# Patient Record
Sex: MALE | Race: WHITE | URBAN - NONMETROPOLITAN AREA
[De-identification: names, ages, dates, MRNs, and addresses within clinical notes are randomized per-mention and may not be internally consistent; named-entity substitution may affect disease eponyms.]

---

## 2020-11-03 PROBLEM — I10 HTN (HYPERTENSION): Status: RESOLVED | Noted: 2020-11-03 | Resolved: 2020-11-03

## 2021-09-10 LAB
ABSOLUTE BASO #: 0 /CMM (ref 0–200)
ABSOLUTE EOS #: 300 /CMM (ref 0–500)
ABSOLUTE LYMPH #: 800 /CMM (ref 1000–4800)
ABSOLUTE MONO #: 500 /CMM (ref 0–800)
ABSOLUTE NEUT #: 6700 /CMM (ref 1800–7700)
ANION GAP SERPL CALCULATED.3IONS-SCNC: 7 MMOL/L (ref 4–12)
BASOPHILS RELATIVE PERCENT: 0.5 % (ref 0–2)
BUN BLDV-MCNC: 23 MG/DL (ref 7–20)
CALCIUM SERPL-MCNC: 8.5 MG/DL (ref 8.8–10.5)
CHLORIDE BLD-SCNC: 108 MEQ/L (ref 101–111)
CO2: 20 MEQ/L (ref 21–32)
CREAT SERPL-MCNC: 1.47 MG/DL (ref 0.6–1.3)
CREATININE CLEARANCE: 47
EOSINOPHILS RELATIVE PERCENT: 3.8 % (ref 0–6)
GLUCOSE: 105 MG/DL (ref 70–110)
HCT VFR BLD CALC: 38.8 % (ref 40–49)
HEMOGLOBIN: 13.3 GM/DL (ref 13.5–16.5)
LYMPHOCYTES RELATIVE PERCENT: 10.1 % (ref 15–45)
MCH RBC QN AUTO: 33.3 PG (ref 27.5–33)
MCHC RBC AUTO-ENTMCNC: 34.3 GM/DL (ref 33–36)
MCV RBC AUTO: 97.1 CU MIC (ref 80–97)
MONOCYTES RELATIVE PERCENT: 5.4 % (ref 2–10)
MRSA SCREEN: NEGATIVE
NEUTROPHILS RELATIVE PERCENT: 80.2 % (ref 40–70)
NUCLEATED RBCS: 0 /100 WBC
PDW BLD-RTO: 13.3 % (ref 12–16)
PLATELET # BLD: 194 TH/CMM (ref 150–400)
POTASSIUM SERPL-SCNC: 4.7 MEQ/L (ref 3.6–5)
RBC # BLD: 4 MIL/CMM (ref 4.5–6)
SODIUM BLD-SCNC: 135 MEQ/L (ref 135–145)
WBC # BLD: 8.4 TH/CMM (ref 4.4–10.5)

## 2022-06-02 ENCOUNTER — OFFICE VISIT (OUTPATIENT)
Dept: PSYCHOLOGY | Age: 72
End: 2022-06-02
Payer: COMMERCIAL

## 2022-06-02 DIAGNOSIS — F43.23 ADJUSTMENT DISORDER WITH MIXED ANXIETY AND DEPRESSED MOOD: Primary | ICD-10-CM

## 2022-06-02 PROCEDURE — 90791 PSYCH DIAGNOSTIC EVALUATION: CPT | Performed by: PSYCHOLOGIST

## 2022-06-02 PROCEDURE — 1123F ACP DISCUSS/DSCN MKR DOCD: CPT | Performed by: PSYCHOLOGIST
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
## 2022-06-10 ENCOUNTER — OFFICE VISIT (OUTPATIENT)
Dept: PSYCHOLOGY | Age: 72
End: 2022-06-10
Payer: COMMERCIAL

## 2022-06-10 DIAGNOSIS — F43.23 ADJUSTMENT DISORDER WITH MIXED ANXIETY AND DEPRESSED MOOD: Primary | ICD-10-CM

## 2022-06-10 PROCEDURE — 1123F ACP DISCUSS/DSCN MKR DOCD: CPT | Performed by: PSYCHOLOGIST

## 2022-06-10 PROCEDURE — 90837 PSYTX W PT 60 MINUTES: CPT | Performed by: PSYCHOLOGIST

## 2022-06-10 NOTE — PROGRESS NOTES
Behavioral Health Consultation  María Simmons, PhD  Psychologist  6/14/2022       Time spent with Patient:  60 minutes  This is patient's second  [facility] appointment. Reason for Consult:  depression, anxiety, stress and cognitive impairment  Referring Provider: No referring provider defined for this encounter. Pt provided informed consent for the behavioral health program. Discussed with patient model of service to include the limits of confidentiality (i.e. abuse reporting, suicide intervention, etc.) and short-term intervention focused approach. Pt indicated understanding. Feedback given to PCP. Description:    MSE:    Appearance    cooperative  Appetite normal  Sleep disturbance Yes  Loss of pleasure No  Speech    normal rate, normal volume and well articulated  Mood    Anxious  Affect    normal affect  Thought Content    helplessness  Insight    Fair  Judgment    Intact  Suicide Assessment    no suicidal ideation  Homicidal Assessment Intent No  Cutting No  Enuresis No  Learning Disorder none      History:    Medications:   Current Outpatient Medications   Medication Sig Dispense Refill    hydrALAZINE (APRESOLINE) 25 MG tablet TAKE ONE TABLET BY MOUTH EVERY 8 HOURS 270 tablet 3    tadalafil (CIALIS) 10 MG tablet Take 1 tablet by mouth as needed for Erectile Dysfunction. 30 tablet 6    omeprazole (PRILOSEC) 20 MG capsule Take 1 capsule by mouth 2 times daily. 30 capsule 6    omeprazole (PRILOSEC) 20 MG capsule TAKE ONE CAPSULE EVERY DAY 30 capsule 11    tadalafil (CIALIS) 5 MG tablet Take 1 tablet by mouth as needed. 30 tablet 6    Testosterone (STRIANT) 30 MG MISC Place 30 mg inside cheek 2 times daily. 60 each 6     No current facility-administered medications for this visit.        Social History:   Social History     Socioeconomic History    Marital status:      Spouse name: Not on file    Number of children: Not on file    Years of education: Not on file    Highest education level: Not on file   Occupational History    Not on file   Tobacco Use    Smoking status: Former Smoker     Packs/day: 1.50     Years: 24.00     Pack years: 36.00     Types: Cigarettes     Quit date: 2002     Years since quittin.4    Smokeless tobacco: Never Used   Substance and Sexual Activity    Alcohol use: No    Drug use: No    Sexual activity: Not on file   Other Topics Concern    Not on file   Social History Narrative    Not on file     Social Determinants of Health     Financial Resource Strain:     Difficulty of Paying Living Expenses: Not on file   Food Insecurity:     Worried About Running Out of Food in the Last Year: Not on file    Nanci of Food in the Last Year: Not on file   Transportation Needs:     Lack of Transportation (Medical): Not on file    Lack of Transportation (Non-Medical): Not on file   Physical Activity:     Days of Exercise per Week: Not on file    Minutes of Exercise per Session: Not on file   Stress:     Feeling of Stress : Not on file   Social Connections:     Frequency of Communication with Friends and Family: Not on file    Frequency of Social Gatherings with Friends and Family: Not on file    Attends Holiness Services: Not on file    Active Member of [address] [place] [address] [employer] or Organizations: Not on file    Attends Club or Organization Meetings: Not on file    Marital Status: Not on file   Intimate Partner Violence:     Fear of Current or Ex-Partner: Not on file    Emotionally Abused: Not on file    Physically Abused: Not on file    Sexually Abused: Not on file   Housing Stability:     Unable to Pay for Housing in the Last Year: Not on file    Number of [place] in the Last Year: Not on file    Unstable Housing in the Last Year: Not on file       TOBACCO:   reports that he quit smoking about 20 years ago. His smoking use included cigarettes. He has a 36.00 pack-year smoking history.  He has never used smokeless tobacco.  ETOH:   reports no history of alcohol use.    Family History:   No family history on file. 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; he has memories of the past that surface at times; this therapist started to give him the [facility] neurological assessment and the time ran out before getting to the practices; this therapist scored the front part of the test and it appears that his some orientation, presidential memory, naming, comprehension, and constructive praxis is not showing any major impairment; however, this therapist needs to finish administering the attention section during the next assessment; he has been very anxious and worried about his cognitive abilities; his is working memory was in the severe range so this therapist will continue to focus on the cognitive issues and also help him develop skills to deal with the stress; he is thinking about retiring. Diagnosis:      ICD-10-CM    1. Adjustment disorder with mixed anxiety and depressed mood  F43.23             Plan:  Pt interventions: The next session the patient will finish the assessment and this therapist will score and then review the results with him; session will continue to help him focus on stress management.

## 2022-06-21 ENCOUNTER — OFFICE VISIT (OUTPATIENT)
Dept: PSYCHOLOGY | Age: 72
End: 2022-06-21
Payer: COMMERCIAL

## 2022-06-21 DIAGNOSIS — F43.23 ADJUSTMENT DISORDER WITH MIXED ANXIETY AND DEPRESSED MOOD: Primary | ICD-10-CM

## 2022-06-21 PROCEDURE — 1123F ACP DISCUSS/DSCN MKR DOCD: CPT | Performed by: PSYCHOLOGIST

## 2022-06-21 PROCEDURE — 90837 PSYTX W PT 60 MINUTES: CPT | Performed by: PSYCHOLOGIST

## 2022-07-12 ENCOUNTER — OFFICE VISIT (OUTPATIENT)
Dept: PSYCHOLOGY | Age: 72
End: 2022-07-12
Payer: COMMERCIAL

## 2022-07-12 DIAGNOSIS — F43.23 ADJUSTMENT DISORDER WITH MIXED ANXIETY AND DEPRESSED MOOD: Primary | ICD-10-CM

## 2022-07-12 PROCEDURE — 1123F ACP DISCUSS/DSCN MKR DOCD: CPT | Performed by: PSYCHOLOGIST

## 2022-07-12 PROCEDURE — 90837 PSYTX W PT 60 MINUTES: CPT | Performed by: PSYCHOLOGIST

## 2022-07-12 NOTE — PROGRESS NOTES
History    Not on file   Tobacco Use    Smoking status: Former Smoker     Packs/day: 1.50     Years: 24.00     Pack years: 36.00     Types: Cigarettes     Quit date: 2002     Years since quittin.5    Smokeless tobacco: Never Used   Substance and Sexual Activity    Alcohol use: No    Drug use: No    Sexual activity: Not on file   Other Topics Concern    Not on file   Social History Narrative    Not on file     Social Determinants of Health     Financial Resource Strain:     Difficulty of Paying Living Expenses: Not on file   Food Insecurity:     Worried About Running Out of Food in the Last Year: Not on file    Nanci of Food in the Last Year: Not on file   Transportation Needs:     Lack of Transportation (Medical): Not on file    Lack of Transportation (Non-Medical): Not on file   Physical Activity:     Days of Exercise per Week: Not on file    Minutes of Exercise per Session: Not on file   Stress:     Feeling of Stress : Not on file   Social Connections:     Frequency of Communication with Friends and Family: Not on file    Frequency of Social Gatherings with Friends and Family: Not on file    Attends Hoahaoism Services: Not on file    Active Member of [address] [place] [address] [employer] or Organizations: Not on file    Attends Club or Organization Meetings: Not on file    Marital Status: Not on file   Intimate Partner Violence:     Fear of Current or Ex-Partner: Not on file    Emotionally Abused: Not on file    Physically Abused: Not on file    Sexually Abused: Not on file   Housing Stability:     Unable to Pay for Housing in the Last Year: Not on file    Number of [place] in the Last Year: Not on file    Unstable Housing in the Last Year: Not on file       TOBACCO:   reports that he quit smoking about 20 years ago. His smoking use included cigarettes. He has a 36.00 pack-year smoking history. He has never used smokeless tobacco.  ETOH:   reports no history of alcohol use.     Family History:   No family history on file. 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 Diagnosis:      ICD-10-CM    1. Adjustment disorder with mixed anxiety and depressed mood  F43.23             Plan:  Pt interventions:  He will continue to focus on today and not drink too much into the future; he will also continue to plan for prison.

## 2024-05-10 ENCOUNTER — HOSPITAL ENCOUNTER (EMERGENCY)
Age: 74
Discharge: PSYCHIATRIC OR SUBSTANCE USE FACILITY | End: 2024-05-11
Attending: STUDENT IN AN ORGANIZED HEALTH CARE EDUCATION/TRAINING PROGRAM
Payer: MEDICARE

## 2024-05-10 ENCOUNTER — APPOINTMENT (OUTPATIENT)
Dept: GENERAL RADIOLOGY | Age: 74
End: 2024-05-10
Payer: MEDICARE

## 2024-05-10 ENCOUNTER — APPOINTMENT (OUTPATIENT)
Dept: CT IMAGING | Age: 74
End: 2024-05-10
Payer: MEDICARE

## 2024-05-10 DIAGNOSIS — R41.82 ALTERED MENTAL STATUS, UNSPECIFIED ALTERED MENTAL STATUS TYPE: Primary | ICD-10-CM

## 2024-05-10 DIAGNOSIS — R45.851 SUICIDAL THOUGHTS: ICD-10-CM

## 2024-05-10 LAB
AMPHETAMINES UR QL SCN: NEGATIVE
ANION GAP SERPL CALC-SCNC: 14 MEQ/L (ref 8–16)
APAP SERPL-MCNC: < 5 UG/ML (ref 0–20)
BACTERIA: ABNORMAL
BARBITURATES UR QL SCN: NEGATIVE
BASOPHILS ABSOLUTE: 0 THOU/MM3 (ref 0–0.1)
BASOPHILS NFR BLD AUTO: 0.5 %
BENZODIAZ UR QL SCN: NEGATIVE
BILIRUB UR QL STRIP: NEGATIVE
BUN SERPL-MCNC: 9 MG/DL (ref 7–22)
BZE UR QL SCN: NEGATIVE
CALCIUM SERPL-MCNC: 9.3 MG/DL (ref 8.5–10.5)
CANNABINOIDS UR QL SCN: POSITIVE
CASTS #/AREA URNS LPF: ABNORMAL /LPF
CASTS #/AREA URNS LPF: ABNORMAL /LPF
CHARACTER UR: CLEAR
CHARCOAL URNS QL MICRO: ABNORMAL
CHLORIDE SERPL-SCNC: 100 MEQ/L (ref 98–111)
CO2 SERPL-SCNC: 23 MEQ/L (ref 23–33)
COLOR UR: YELLOW
CREAT SERPL-MCNC: 0.9 MG/DL (ref 0.4–1.2)
CRYSTALS URNS QL MICRO: ABNORMAL
DEPRECATED RDW RBC AUTO: 44.2 FL (ref 35–45)
EKG ATRIAL RATE: 83 BPM
EKG P AXIS: 48 DEGREES
EKG P-R INTERVAL: 166 MS
EKG Q-T INTERVAL: 352 MS
EKG QRS DURATION: 94 MS
EKG QTC CALCULATION (BAZETT): 413 MS
EKG R AXIS: -55 DEGREES
EKG T AXIS: 70 DEGREES
EKG VENTRICULAR RATE: 83 BPM
EOSINOPHIL NFR BLD AUTO: 3.8 %
EOSINOPHILS ABSOLUTE: 0.2 THOU/MM3 (ref 0–0.4)
EPITHELIAL CELLS, UA: ABNORMAL /HPF
ERYTHROCYTE [DISTWIDTH] IN BLOOD BY AUTOMATED COUNT: 13.1 % (ref 11.5–14.5)
ETHANOL SERPL-MCNC: < 0.01 %
FENTANYL: NEGATIVE
GFR SERPL CREATININE-BSD FRML MDRD: 88 ML/MIN/1.73M2
GLUCOSE SERPL-MCNC: 86 MG/DL (ref 70–108)
GLUCOSE UR QL STRIP.AUTO: NEGATIVE MG/DL
HCT VFR BLD AUTO: 44.8 % (ref 42–52)
HGB BLD-MCNC: 15.8 GM/DL (ref 14–18)
HGB UR QL STRIP.AUTO: NEGATIVE
IMM GRANULOCYTES # BLD AUTO: 0.01 THOU/MM3 (ref 0–0.07)
IMM GRANULOCYTES NFR BLD AUTO: 0.2 %
KETONES UR QL STRIP.AUTO: NEGATIVE
LEUKOCYTE ESTERASE UR QL STRIP.AUTO: NEGATIVE
LYMPHOCYTES ABSOLUTE: 1.4 THOU/MM3 (ref 1–4.8)
LYMPHOCYTES NFR BLD AUTO: 22 %
MCH RBC QN AUTO: 32.5 PG (ref 26–33)
MCHC RBC AUTO-ENTMCNC: 35.3 GM/DL (ref 32.2–35.5)
MCV RBC AUTO: 92.2 FL (ref 80–94)
MONOCYTES ABSOLUTE: 0.4 THOU/MM3 (ref 0.4–1.3)
MONOCYTES NFR BLD AUTO: 7 %
NEUTROPHILS ABSOLUTE: 4.2 THOU/MM3 (ref 1.8–7.7)
NEUTROPHILS NFR BLD AUTO: 66.5 %
NITRITE UR QL STRIP.AUTO: NEGATIVE
NRBC BLD AUTO-RTO: 0 /100 WBC
OPIATES UR QL SCN: NEGATIVE
OSMOLALITY SERPL CALC.SUM OF ELEC: 271.8 MOSMOL/KG (ref 275–300)
OXYCODONE: NEGATIVE
PCP UR QL SCN: NEGATIVE
PH UR STRIP.AUTO: 6.5 [PH] (ref 5–9)
PLATELET # BLD AUTO: 268 THOU/MM3 (ref 130–400)
PMV BLD AUTO: 10.1 FL (ref 9.4–12.4)
POTASSIUM SERPL-SCNC: 3.7 MEQ/L (ref 3.5–5.2)
PROT UR STRIP.AUTO-MCNC: 30 MG/DL
RBC # BLD AUTO: 4.86 MILL/MM3 (ref 4.7–6.1)
RBC #/AREA URNS HPF: ABNORMAL /HPF
RENAL EPI CELLS #/AREA URNS HPF: ABNORMAL /[HPF]
SALICYLATES SERPL-MCNC: < 0.3 MG/DL (ref 2–10)
SODIUM SERPL-SCNC: 137 MEQ/L (ref 135–145)
SPECIFIC GRAVITY UA: 1.01 (ref 1–1.03)
TROPONIN, HIGH SENSITIVITY: 13 NG/L (ref 0–12)
UROBILINOGEN, URINE: 1 EU/DL (ref 0–1)
WBC # BLD AUTO: 6.3 THOU/MM3 (ref 4.8–10.8)
WBC #/AREA URNS HPF: ABNORMAL /HPF
YEAST LIKE FUNGI URNS QL MICRO: ABNORMAL

## 2024-05-10 PROCEDURE — 81001 URINALYSIS AUTO W/SCOPE: CPT

## 2024-05-10 PROCEDURE — 99285 EMERGENCY DEPT VISIT HI MDM: CPT

## 2024-05-10 PROCEDURE — 80048 BASIC METABOLIC PNL TOTAL CA: CPT

## 2024-05-10 PROCEDURE — 85025 COMPLETE CBC W/AUTO DIFF WBC: CPT

## 2024-05-10 PROCEDURE — 93005 ELECTROCARDIOGRAM TRACING: CPT | Performed by: STUDENT IN AN ORGANIZED HEALTH CARE EDUCATION/TRAINING PROGRAM

## 2024-05-10 PROCEDURE — 70450 CT HEAD/BRAIN W/O DYE: CPT

## 2024-05-10 PROCEDURE — 84484 ASSAY OF TROPONIN QUANT: CPT

## 2024-05-10 PROCEDURE — 80143 DRUG ASSAY ACETAMINOPHEN: CPT

## 2024-05-10 PROCEDURE — 82077 ASSAY SPEC XCP UR&BREATH IA: CPT

## 2024-05-10 PROCEDURE — 36415 COLL VENOUS BLD VENIPUNCTURE: CPT

## 2024-05-10 PROCEDURE — 6370000000 HC RX 637 (ALT 250 FOR IP)

## 2024-05-10 PROCEDURE — 80307 DRUG TEST PRSMV CHEM ANLYZR: CPT

## 2024-05-10 PROCEDURE — 80179 DRUG ASSAY SALICYLATE: CPT

## 2024-05-10 PROCEDURE — 93010 ELECTROCARDIOGRAM REPORT: CPT | Performed by: INTERNAL MEDICINE

## 2024-05-10 PROCEDURE — 71045 X-RAY EXAM CHEST 1 VIEW: CPT

## 2024-05-10 RX ORDER — QUETIAPINE FUMARATE 25 MG/1
25 TABLET, FILM COATED ORAL ONCE
Status: COMPLETED | OUTPATIENT
Start: 2024-05-10 | End: 2024-05-10

## 2024-05-10 RX ADMIN — QUETIAPINE 25 MG: 25 TABLET, FILM COATED ORAL at 22:39

## 2024-05-10 ASSESSMENT — PATIENT HEALTH QUESTIONNAIRE - PHQ9
SUM OF ALL RESPONSES TO PHQ QUESTIONS 1-9: 0
1. LITTLE INTEREST OR PLEASURE IN DOING THINGS: NOT AT ALL
SUM OF ALL RESPONSES TO PHQ QUESTIONS 1-9: 0
SUM OF ALL RESPONSES TO PHQ QUESTIONS 1-9: 0
2. FEELING DOWN, DEPRESSED OR HOPELESS: NOT AT ALL
SUM OF ALL RESPONSES TO PHQ9 QUESTIONS 1 & 2: 0
SUM OF ALL RESPONSES TO PHQ QUESTIONS 1-9: 0

## 2024-05-10 ASSESSMENT — PAIN - FUNCTIONAL ASSESSMENT
PAIN_FUNCTIONAL_ASSESSMENT: NONE - DENIES PAIN
PAIN_FUNCTIONAL_ASSESSMENT: NONE - DENIES PAIN

## 2024-05-10 ASSESSMENT — SLEEP AND FATIGUE QUESTIONNAIRES
DO YOU HAVE DIFFICULTY SLEEPING: YES
SLEEP PATTERN: DIFFICULTY FALLING ASLEEP;DISTURBED/INTERRUPTED SLEEP
DO YOU USE A SLEEP AID: NO

## 2024-05-10 NOTE — ED NOTES
Pt handoff report received from AMBER Monte at this time. No acute distress noted. Pt reminded to attempt to give UA. Family at bedside. Call light in reach.

## 2024-05-10 NOTE — ED TRIAGE NOTES
Pt presents to the ED with family for altered mental status. Upon arrival to the ED pt is alert to person and place. She states he depends on her for the month and time of day. Pt is unable to state the correct year which his wife states is not normal for him. Wife states that last night he did not recognize her when she was sitting beside him. She states earlier today he got into the car and asked where she was when she was sitting beside her. Pt's wife states pt was diagnosed with dementia 2 years ago.  Pt denies pain.

## 2024-05-10 NOTE — ED PROVIDER NOTES
Final report electronically signed by Dr Frank Robertson on 5/10/2024 4:42 PM        See ED course below for my interpretation if applicable.  All radiology images independently reviewed by me in the clinical context of this patient, in addition to interpretation provided by the radiologist.      EKG interpretation (none if blank):  Normal sinus rhythm at a rate 83, no ST elevation  All EKG results are individually reviewed and interpreted by me in the clinical context of this patient.  All EKGs are also interpreted by our Cardiology department, final interpretation may not be available as of the writing of this note.      PREVIOUS RECORDS  AND EXTERNAL INFORMATION REVIEWED   History obtained from: chart review, the patient, and wife.    Pertinent previous and/or external records reviewed:  Previous hospital visits .    Case discussed with specialties other than Emergency Medicine: Behavioral access center/[facility], [place]      MEDICAL DECISION MAKING   Initial Assessment Summary:   74-year-old male past medical history hypertension, carpal tunnel, GERD, dementia presents to the ED for altered mental status.   Please see ED course and MDM sections below for continuation and resolution of this initial assessment if applicable.    Initial plan:   CBC, BMP, troponin, urinalysis  EKG  CT head without contra       Comorbid conditions pertinent to this ED encounter:  Not applicable      Differential Diagnosis includes but is not limited to:  Dementia, UTI, infection, ACS, tumor, subarachnoid hemorrhage, subdural hemorrhage, stroke, electrolyte abnormality       Decision Rules/Clinical Scores utilized:  Not Applicable       Code Status:  Not addressed during this ED visit    Social determinants of health impacting treatment or disposition:  Considered and not applicable     MIPS documentation:  N/A    Medical Decision Making    74-year-old male past medical history hypertension, carpal tunnel, GERD, dementia presents to the ED  for altered mental status.  Labs and imaging were obtained.  Patient's labs were grossly unremarkable.  CT head showed no acute process.  I discussed the results with patient and wife at bedside.  Hide VIOLETA speak to the patient for concerns of suicidal statements in the past.  Patient currently not suicidal however I believe him to be high risk with his dementia, access to guns and comments.  VIOLETA agreed that the patient would benefit from inpatient therapy.  Patient was EMC'd for transfer to [facility].      ED COURSE   ED Medications administered this visit (None if left blank):   Medications   QUEtiapine (SEROQUEL) tablet 25 mg (25 mg Oral Given 5/10/24 2239)         ED Course as of 05/11/24 0122   Fri May 10, 2024   1913 Patient medically cleared [ML]      ED Course User Index  [ML] Sylvain Mcdaniel MD         PROCEDURES: (None if blank)  Procedures:     CRITICAL CARE:  None    MEDICATION CHANGES     New Prescriptions    No medications on file         FINAL DISPOSITION   Shared Decision-Making was performed, disposition discussed with the patient/family and questions answered.      Outpatient follow up (If applicable):  No follow-up provider specified.  Not applicable              FINAL DIAGNOSES:  Final diagnoses:   Altered mental status, unspecified altered mental status type   Suicidal thoughts       Condition: condition: good  Dispo: Transfer to [facility] inpatient geriatric psychiatric unit  DISPOSITION Decision To Transfer 05/11/2024 01:14:20 AM      This transcription was electronically signed. It was dictated by use of voice recognition software and electronically transcribed. The transcription may contain errors not detected in proofreading.

## 2024-05-11 ENCOUNTER — HOSPITAL ENCOUNTER (INPATIENT)
Age: 74
LOS: 7 days | Discharge: HOME | End: 2024-05-18
Attending: PSYCHIATRY & NEUROLOGY | Admitting: PSYCHIATRY & NEUROLOGY
Payer: MEDICARE

## 2024-05-11 VITALS
WEIGHT: 206 LBS | TEMPERATURE: 98.5 F | BODY MASS INDEX: 27.9 KG/M2 | RESPIRATION RATE: 20 BRPM | OXYGEN SATURATION: 98 % | DIASTOLIC BLOOD PRESSURE: 79 MMHG | HEIGHT: 72 IN | HEART RATE: 98 BPM | SYSTOLIC BLOOD PRESSURE: 147 MMHG

## 2024-05-11 PROBLEM — F03.918 DEMENTIA WITH BEHAVIORAL DISTURBANCE (HCC): Status: ACTIVE | Noted: 2024-05-11

## 2024-05-11 PROBLEM — R45.851 DEPRESSION WITH SUICIDAL IDEATION: Status: ACTIVE | Noted: 2024-05-11

## 2024-05-11 PROBLEM — F32.A DEPRESSION WITH SUICIDAL IDEATION: Status: ACTIVE | Noted: 2024-05-11

## 2024-05-11 LAB — TROPONIN I SERPL HS-MCNC: 19 NG/L (ref 0–22)

## 2024-05-11 PROCEDURE — 6370000000 HC RX 637 (ALT 250 FOR IP)

## 2024-05-11 PROCEDURE — 99223 1ST HOSP IP/OBS HIGH 75: CPT

## 2024-05-11 PROCEDURE — 1240000000 HC EMOTIONAL WELLNESS R&B

## 2024-05-11 PROCEDURE — 99223 1ST HOSP IP/OBS HIGH 75: CPT | Performed by: INTERNAL MEDICINE

## 2024-05-11 PROCEDURE — 36415 COLL VENOUS BLD VENIPUNCTURE: CPT

## 2024-05-11 PROCEDURE — 84484 ASSAY OF TROPONIN QUANT: CPT

## 2024-05-11 PROCEDURE — 6370000000 HC RX 637 (ALT 250 FOR IP): Performed by: INTERNAL MEDICINE

## 2024-05-11 PROCEDURE — 6370000000 HC RX 637 (ALT 250 FOR IP): Performed by: NURSE PRACTITIONER

## 2024-05-11 RX ORDER — TRAZODONE HYDROCHLORIDE 50 MG/1
50 TABLET ORAL NIGHTLY PRN
Status: DISCONTINUED | OUTPATIENT
Start: 2024-05-11 | End: 2024-05-18 | Stop reason: HOSPADM

## 2024-05-11 RX ORDER — RISPERIDONE 0.5 MG/1
0.5 TABLET ORAL 2 TIMES DAILY PRN
Status: DISCONTINUED | OUTPATIENT
Start: 2024-05-11 | End: 2024-05-18 | Stop reason: HOSPADM

## 2024-05-11 RX ORDER — PANTOPRAZOLE SODIUM 40 MG/1
40 TABLET, DELAYED RELEASE ORAL
Status: DISCONTINUED | OUTPATIENT
Start: 2024-05-12 | End: 2024-05-18 | Stop reason: HOSPADM

## 2024-05-11 RX ORDER — ACETAMINOPHEN 325 MG/1
650 TABLET ORAL EVERY 4 HOURS PRN
Status: DISCONTINUED | OUTPATIENT
Start: 2024-05-11 | End: 2024-05-18 | Stop reason: HOSPADM

## 2024-05-11 RX ORDER — MAGNESIUM HYDROXIDE/ALUMINUM HYDROXICE/SIMETHICONE 120; 1200; 1200 MG/30ML; MG/30ML; MG/30ML
30 SUSPENSION ORAL EVERY 6 HOURS PRN
Status: DISCONTINUED | OUTPATIENT
Start: 2024-05-11 | End: 2024-05-18 | Stop reason: HOSPADM

## 2024-05-11 RX ORDER — RIVASTIGMINE 4.6 MG/24H
1 PATCH, EXTENDED RELEASE TRANSDERMAL DAILY
Status: DISCONTINUED | OUTPATIENT
Start: 2024-05-11 | End: 2024-05-18 | Stop reason: HOSPADM

## 2024-05-11 RX ORDER — POLYETHYLENE GLYCOL 3350 17 G/17G
17 POWDER, FOR SOLUTION ORAL DAILY PRN
Status: DISCONTINUED | OUTPATIENT
Start: 2024-05-11 | End: 2024-05-18 | Stop reason: HOSPADM

## 2024-05-11 RX ORDER — HYDRALAZINE HYDROCHLORIDE 25 MG/1
25 TABLET, FILM COATED ORAL EVERY 8 HOURS SCHEDULED
Status: DISCONTINUED | OUTPATIENT
Start: 2024-05-11 | End: 2024-05-18 | Stop reason: HOSPADM

## 2024-05-11 RX ADMIN — HYDRALAZINE HYDROCHLORIDE 25 MG: 25 TABLET ORAL at 17:24

## 2024-05-11 RX ADMIN — HYDRALAZINE HYDROCHLORIDE 25 MG: 25 TABLET ORAL at 21:09

## 2024-05-11 RX ADMIN — TRAZODONE HYDROCHLORIDE 50 MG: 50 TABLET ORAL at 21:09

## 2024-05-11 ASSESSMENT — LIFESTYLE VARIABLES
HOW OFTEN DO YOU HAVE A DRINK CONTAINING ALCOHOL: NEVER
HOW MANY STANDARD DRINKS CONTAINING ALCOHOL DO YOU HAVE ON A TYPICAL DAY: PATIENT DOES NOT DRINK
HOW MANY STANDARD DRINKS CONTAINING ALCOHOL DO YOU HAVE ON A TYPICAL DAY: PATIENT DOES NOT DRINK
HOW OFTEN DO YOU HAVE A DRINK CONTAINING ALCOHOL: NEVER

## 2024-05-11 ASSESSMENT — SLEEP AND FATIGUE QUESTIONNAIRES
AVERAGE NUMBER OF SLEEP HOURS: 6
DO YOU USE A SLEEP AID: NO
SLEEP PATTERN: DIFFICULTY FALLING ASLEEP
DO YOU HAVE DIFFICULTY SLEEPING: YES

## 2024-05-11 NOTE — ED NOTES
Pt ambulated to bathroom without difficulty. Pt returned to cot with no acute distress noted. Breakfast tray ordered.

## 2024-05-11 NOTE — ED NOTES
Patient report received from Raz CLARK. Pt resting on cot in ED room 3, respirations are equal and unlabored. Bedside sitter remains outside of room continuously monitoring patient

## 2024-05-11 NOTE — ED NOTES
Patient climbed out of bed, wandering around ED room 3 asking how he got here. This RN redirected patient to bed and patient provided black coffee per pt request. Pt resting on cot watching television at this time. Sitter monitoring patient outside room at this time.

## 2024-05-11 NOTE — ED NOTES
Pt resting on cot with eyes closed, respirations are equal and unlabored. Call light in reach, will continue to monitor

## 2024-05-11 NOTE — PLAN OF CARE
Problem: Behavior  Goal: Pt/Family maintain appropriate behavior and adhere to behavioral management agreement, if implemented  Description: INTERVENTIONS:  1. Assess patient/family's coping skills and  non-compliant behavior (including use of illegal substances)  2. Notify security of behavior or suspected illegal substances which indicate the need for search of the family and/or belongings  3. Encourage verbalization of thoughts and concerns in a socially appropriate manner  4. Utilize positive, consistent limit setting strategies supporting safety of patient, staff and others  5. Encourage participation in the decision making process about the behavioral management agreement  6. If a visitor's behavior poses a threat to safety call refer to organization policy.  7. Initiate consult with , Psychosocial CNS, Spiritual Care as appropriate  5/11/2024 1930 by Jeannie Phan  Outcome: Progressing     Patient stated he is feeling pretty good, patient gave writer his full name and his date of birth. Patient was able to answer authors question patient appeared orientated to self but not place or time. Patient stated he is not currently having any anxiety or depression. Patient stated he is eating and sleeping well, Patient stated he is not hearing or seeing things that aren't there. Author asked patient does he know why he is here. Patient stated he is here for his daughter and his family. Patient remains free from self harm this shift. Patient denies wanting to cause harm to self or others at this time. Patient encouraged to seek nursing staff at anytime if he felt at danger to himself or others. Patient states understanding. Safety checks maintained [id number] as well as 1:1 safety observation.

## 2024-05-11 NOTE — BH NOTE
Patient arrives to unit via stretcher and two transport staff with belongings, IV in right forearm, wearing his shoes, socks, shirt and pants.

## 2024-05-11 NOTE — H&P
IN-PATIENT SERVICE  [facility] Internal Medicine    HISTORY AND PHYSICAL EXAMINATION/ CONSULT NOTE            Date:   5/11/2024  Patient name:  Esau Fan  Date of admission:  5/11/2024 11:10 AM  MRN:   [id number]  Account:  [id number]  YOB: 1950  PCP:    Baldo Callejas DO  Room:   [address] [place] [address]  Code Status:    Full Code    Physician Requesting Consult: Amarjit Hebert MD    Reason for Consult: History and physical, medical management    Chief Complaint:     No chief complaint on file.    Medical management    History Obtained From:     Patient, EMR, nursing staff    History of Present Illness:     74-year-old male admitted for depression with suicidal ideation  Medical history relevant for hypertension, GERD  HTN  Onset more than 2 years ago  Shreya: mild to mod  Usually controlled with current po meds  Not associated with headaches or blurry vision  No chest pain      Past Medical History:     Past Medical History:   Diagnosis Date   • Carpal tunnel syndrome on left    • Dyspepsia     and heartburn   • GERD (gastroesophageal reflux disease)    • Hiatal hernia    • HTN (hypertension) 10/03    isolated systolic   • Neuroma     right foot   • S/P arthroscopy     right thumb   • Smoking history 1969 to 2006    2 ppd   • Solitary pulmonary nodule    • Tinnitus         Past Surgical History:     No past surgical history on file.     Medications Prior to Admission:     Prior to Admission medications    Medication Sig Start Date End Date Taking? Authorizing Provider   hydrALAZINE (APRESOLINE) 25 MG tablet TAKE ONE TABLET BY MOUTH EVERY 8 HOURS 3/24/15   Baldo Callejas DO   tadalafil (CIALIS) 10 MG tablet Take 1 tablet by mouth as needed for Erectile Dysfunction. 9/2/14   Baldo Callejas DO   omeprazole (PRILOSEC) 20 MG capsule Take 1 capsule by mouth 2 times daily. 9/2/14   Baldo Callejas DO   omeprazole (PRILOSEC) 20 MG capsule TAKE ONE CAPSULE EVERY DAY 2/27/14   Baldo Callejas,

## 2024-05-11 NOTE — ED NOTES
Family notified of eta for transport. VSS no acute distress noted patient resting in bed with eyes closed. Respirations are even and unlabored at this time. Call light in room. Family at bedside.

## 2024-05-11 NOTE — GROUP NOTE
Psych-Ed/Relapse Prevention Group Note        Date: May 10, 2024 Start Time: 2:30pm  End Time: 3pm      Number of Participants in Group & Unit Census:  2/9    Topic: Socialization    Goal of Group:Patient will demonstrate improved interpersonal skills      Comments:     Patient did not participate in Psych-Ed/Relapse Prevention group, despite staff encouragement and explanation of benefits.  Patient remain seclusive to self.  Q15 minute safety checks maintained for patient safety and will continue to encourage patient to attend unit programming.         Signature:  JOCELYNN GanS

## 2024-05-11 NOTE — ED NOTES
Pt pacing in room at this time. No acute distress noted. Social work spoke with patients wife while entering room.

## 2024-05-11 NOTE — PLAN OF CARE
Problem: Self Harm/Suicidality  Goal: Will have no self-injury during hospital stay  Description: INTERVENTIONS:  1.  Ensure constant observer at bedside with Q15M safety checks  2.  Maintain a safe environment  3.  Secure patient belongings  4.  Ensure family/visitors adhere to safety recommendations  5.  Ensure safety tray has been added to patient's diet order  6.  Every shift and PRN: Re-assess suicidal risk via Frequent Screener    Outcome: Progressing     Problem: Behavior  Goal: Pt/Family maintain appropriate behavior and adhere to behavioral management agreement, if implemented  Description: INTERVENTIONS:  1. Assess patient/family's coping skills and  non-compliant behavior (including use of illegal substances)  2. Notify security of behavior or suspected illegal substances which indicate the need for search of the family and/or belongings  3. Encourage verbalization of thoughts and concerns in a socially appropriate manner  4. Utilize positive, consistent limit setting strategies supporting safety of patient, staff and others  5. Encourage participation in the decision making process about the behavioral management agreement  6. If a visitor's behavior poses a threat to safety call refer to organization policy.  7. Initiate consult with , Psychosocial CNS, Spiritual Care as appropriate  Outcome: Progressing     Problem: Sleep Disturbance  Goal: Will exhibit normal sleeping pattern  Description: INTERVENTIONS:  1. Administer medication as ordered  2. Decrease environmental stimuli, including noise, as appropriate  3. Discourage social isolation and naps during the day  Outcome: Progressing     Problem: Self Care Deficit  Goal: Return ADL status to a safe level of function  Description: INTERVENTIONS:  1. Administer medication as ordered  2. Assess ADL deficits and provide assistive devices as needed  3. Obtain PT/OT consults as needed  4. Assist and instruct patient to increase activity and

## 2024-05-11 NOTE — BH NOTE
[facility]  Admission Note     Admission Type:   Admission Type: Voluntary    Reason for admission:  Reason for Admission: Patient presented to [facility] emergency department with altered mental status, alert to person and place. Spouse states he depends on her for all care but did not recognize her last night. Spouse states he got into the car and asked her \"where is Marialuisa?\" (which is her name) and she was sitting right next to him.      Addictive Behavior: none       Medical Problems:   Past Medical History:   Diagnosis Date    Carpal tunnel syndrome on left     Dyspepsia     and heartburn    GERD (gastroesophageal reflux disease)     Hiatal hernia     HTN (hypertension) 10/03    isolated systolic    Neuroma     right foot    S/P arthroscopy     right thumb    Smoking history 1969 to 2006    2 ppd    Solitary pulmonary nodule     Tinnitus        Status EXAM:  Mental Status and Behavioral Exam  Normal: No  Level of Assistance: Independent/Self  Facial Expression: Brightened  Affect: Appropriate  Level of Consciousness: Confused  Frequency of Checks: 4 times per hour, close  Mood:Normal: Yes  Motor Activity:Normal: Yes  Eye Contact: Fair  Observed Behavior: Cooperative  Sexual Misconduct History: Past - no  Preception: Marshall to person, Marshall to place  Attention:Normal: Yes  Thought Processes: Blocking  Thought Content:Normal: Yes  Depression Symptoms: Sleep disturbance  Anxiety Symptoms: No problems reported or observed.  Sherly Symptoms: No problems reported or observed.  Hallucinations: None  Delusions: No  Memory:Normal: No  Memory: Poor recent  Insight and Judgment: No  Insight and Judgment: Poor insight    Tobacco Screening:  Practical Counseling, on admission, nya X, if applicable and completed (first 3 are required if patient doesn't refuse):            ( ) Recognizing danger situations (included triggers and roadblocks)                    ( ) Coping skills (new ways to manage  stress,relaxation techniques, changing routine, distraction)                                                           ( ) Basic information about quitting (benefits of quitting, techniques in how to quit, available resources  ( ) [facility]                                                                                                                   ( x) Patient refused counseling  ( ) Patient has not smoked in the last 30 days    Metabolic Screening:    Lab Results   Component Value Date    LABA1C 5.4 03/28/2015       Lab Results   Component Value Date    CHOL 148 03/28/2015     Lab Results   Component Value Date    TRIG 340 (H) 03/28/2015     Lab Results   Component Value Date    HDL 31 (L) 03/28/2015     No components found for: \"LDLCAL\"  No components found for: \"LABVLDL\"      Body mass index is 27.93 kg/m².    BP Readings from Last 2 Encounters:   05/11/24 (!) 142/76   05/11/24 (!) 147/79       Pt admitted with followings belongings:  Dental Appliances: None  Vision - Corrective Lenses: Eyeglasses  Hearing Aid: None  Jewelry: Ring  Body Piercings Removed: N/A  Clothing: Belt, Footwear, Shirt, Pants, Undergarments, Socks  Other Valuables: Cigarettes, Lighter/Matches  The following personal items were collected during admission. Items secured in locker/safe. Items will be returned to patient at discharge.     Kwesi Wakefield RN
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
## 2024-05-11 NOTE — ED NOTES
Breakfast tray given to patient at this time. Patient remains calm and cooperative. Patient pacing back and forth in room drinking coffee. Patient is easily redirected

## 2024-05-11 NOTE — ED NOTES
This RN to the bedside for rounding at this time. Patient updated on current plan of care and provider with update patient with labs and scans once completed. Patient confused on plan of action pt oriented to person only. Patient denies further needs at this time. Patient respirations are regular and unlabored at this time. Patient does not appear to be in acute distress at this time. Call light within reach. Pt provided with box lunch and beverages along with warm blankets and pillows.

## 2024-05-11 NOTE — BH NOTE
Personal belonging came soiled with urine upon admission.  Writer washes clothes. Pt clothing continues to smell of urine. Writer puts clothes through wash cycle again.
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
## 2024-05-11 NOTE — BH NOTE
2nd wash cycle complete. Pt clothes are now free of foul smelling urine. Clothing placed in dryer.

## 2024-05-11 NOTE — PROGRESS NOTES
0324  Call from Nurse Farley of [facility] informing pt cannot arrive to [facility] until after 7am. Nurse Farley wanted to inform of this information just in case the ED located an earlier transport.   
live . She goes on to say that that he things he is going to his office even though his office has been closed for 6 weeks.    If collateral was not obtained why (if obtained then NA):  NA    Provisional Diagnosis (ICD or DSM approved diagnosis only) : Dementia       BACKGROUND  Risk, Psychosocial and Contextual Factors: (EXAMPLE - homeless, lack of social support, lack of family, unemployed, debt, legal, etc.): MH decline    Protective Factors:  family support , stable housing     Current MH Treatment: denies      Past MH Treatment or Hospitalization (Previous 6 months):   denies       Present Suicidal Behavior (Include specific information below):  denies    Verbal:  denies    Attempt:  denies    Access to Weapons:   Pt reports that his weapons are in a safe in his bed room.    Access to the Means of self harm or harm to others identified:    Pt reports that his weapons are in a safe in his bed room.       C-SSRS Current Suicide Risk: Low, Moderate or High:    morderate      Past Suicidal Behavior (Include specific information below):   Pt reports yesterday that he was tired of the pain and I wanted it to stop.    Verbal:  xxx    Attempts:   denies    Self-Injurious/Self-Mutilation: (Specify what, how often, last time, method, etc.)   denies    Traumatic Event Within Past 2 Weeks: (Specify)  denies    Current Abuse:  (type, perpetrator, systems involved, injuries, etc.)  denies      Legal Involvement: (Specify)   denies    Violence: (Specify)   denies    Housing:   Pt lives with his wife .    CPAP/Oxygen/Ambulation Difficulties Provide pertinent results HERE.  (\"See H&P\" or \"Record\" is not acceptable response): denies    Critical Lab Results (Provide pertinent results HERE.  \"See H&P\" or \"Record\" is not acceptable response.:   Pt was positive for cannabis      Assessment  Clinical Summary:  Pt  is a 74 year old man who presented to the ED with his wife and daughter reporting concerns of altered mental status . Pt

## 2024-05-11 NOTE — BH NOTE
[facility]  Initial Interdisciplinary Treatment Plan NO      Original treatment plan Date & Time: 5/11/2024   1242    Admission Type:       Reason for admission:        Estimated Length of Stay:  5-7days  Estimated Discharge Date: to be determined by physician    PATIENT STRENGTHS:  Patient Strengths:   Patient Strengths and Limitations:   Addictive Behavior:    Medical Problems:  Past Medical History:   Diagnosis Date    Carpal tunnel syndrome on left     Dyspepsia     and heartburn    GERD (gastroesophageal reflux disease)     Hiatal hernia     HTN (hypertension) 10/03    isolated systolic    Neuroma     right foot    S/P arthroscopy     right thumb    Smoking history 1969 to 2006    2 ppd    Solitary pulmonary nodule     Tinnitus      Status EXAM:     EDUCATION:   Learner Progress Toward Treatment Goals: reviewed group plans and strategies for care    Method:group therapy, medication compliance, individualized assessments and care planning    Outcome: needs reinforcement    PATIENT GOALS: to be discussed with patient within 72 hours    PLAN/TREATMENT RECOMMENDATIONS:     continue group therapy , medications compliance, goal setting, individualized assessments and care, continue to monitor pt on unit      SHORT-TERM GOALS:   Time frame for Short-Term Goals: 5-7 days    LONG-TERM GOALS:  Time frame for Long-Term Goals: 6 months  Members Present in Team Meeting: See Signature Sheet    Keyonna Gonzalez RN

## 2024-05-11 NOTE — ED NOTES
Pt resting on cot with eyes closed, respirations are equal and unlabored. Call light in reach, will continue to monitor. Pt remains under continuous supervision of bedside sitter

## 2024-05-11 NOTE — ED NOTES
Patient incontinent of urine. New pants provided for patient with brief. Patient calm and cooperative.

## 2024-05-12 LAB
ALBUMIN SERPL-MCNC: 3.6 G/DL (ref 3.5–5.2)
ALP SERPL-CCNC: 87 U/L (ref 40–129)
ALT SERPL-CCNC: 9 U/L (ref 5–41)
AST SERPL-CCNC: 17 U/L
BILIRUB DIRECT SERPL-MCNC: 0.2 MG/DL
BILIRUB INDIRECT SERPL-MCNC: 0.4 MG/DL (ref 0–1)
BILIRUB SERPL-MCNC: 0.6 MG/DL (ref 0.3–1.2)
CHOLEST SERPL-MCNC: 128 MG/DL
CHOLESTEROL/HDL RATIO: 3.1
EST. AVERAGE GLUCOSE BLD GHB EST-MCNC: 114 MG/DL
HBA1C MFR BLD: 5.6 % (ref 4–6)
HDLC SERPL-MCNC: 41 MG/DL
LDLC SERPL CALC-MCNC: 70 MG/DL (ref 0–130)
PROT SERPL-MCNC: 6.5 G/DL (ref 6.4–8.3)
TRIGL SERPL-MCNC: 86 MG/DL
TSH SERPL DL<=0.05 MIU/L-ACNC: 0.82 UIU/ML (ref 0.3–5)

## 2024-05-12 PROCEDURE — 6370000000 HC RX 637 (ALT 250 FOR IP): Performed by: INTERNAL MEDICINE

## 2024-05-12 PROCEDURE — 80076 HEPATIC FUNCTION PANEL: CPT

## 2024-05-12 PROCEDURE — 97166 OT EVAL MOD COMPLEX 45 MIN: CPT

## 2024-05-12 PROCEDURE — 84443 ASSAY THYROID STIM HORMONE: CPT

## 2024-05-12 PROCEDURE — 6370000000 HC RX 637 (ALT 250 FOR IP)

## 2024-05-12 PROCEDURE — 80061 LIPID PANEL: CPT

## 2024-05-12 PROCEDURE — 36415 COLL VENOUS BLD VENIPUNCTURE: CPT

## 2024-05-12 PROCEDURE — 99232 SBSQ HOSP IP/OBS MODERATE 35: CPT | Performed by: NURSE PRACTITIONER

## 2024-05-12 PROCEDURE — 1240000000 HC EMOTIONAL WELLNESS R&B

## 2024-05-12 PROCEDURE — 6370000000 HC RX 637 (ALT 250 FOR IP): Performed by: NURSE PRACTITIONER

## 2024-05-12 PROCEDURE — 97162 PT EVAL MOD COMPLEX 30 MIN: CPT

## 2024-05-12 PROCEDURE — 97535 SELF CARE MNGMENT TRAINING: CPT

## 2024-05-12 PROCEDURE — 97116 GAIT TRAINING THERAPY: CPT

## 2024-05-12 PROCEDURE — 83036 HEMOGLOBIN GLYCOSYLATED A1C: CPT

## 2024-05-12 RX ORDER — MIRTAZAPINE 15 MG/1
7.5 TABLET, FILM COATED ORAL NIGHTLY
Status: DISCONTINUED | OUTPATIENT
Start: 2024-05-12 | End: 2024-05-18 | Stop reason: HOSPADM

## 2024-05-12 RX ORDER — NICOTINE 21 MG/24HR
1 PATCH, TRANSDERMAL 24 HOURS TRANSDERMAL DAILY
Status: DISCONTINUED | OUTPATIENT
Start: 2024-05-12 | End: 2024-05-18 | Stop reason: HOSPADM

## 2024-05-12 RX ADMIN — MIRTAZAPINE 7.5 MG: 15 TABLET, FILM COATED ORAL at 21:29

## 2024-05-12 RX ADMIN — HYDRALAZINE HYDROCHLORIDE 25 MG: 25 TABLET ORAL at 13:53

## 2024-05-12 RX ADMIN — HYDRALAZINE HYDROCHLORIDE 25 MG: 25 TABLET ORAL at 21:29

## 2024-05-12 RX ADMIN — TRAZODONE HYDROCHLORIDE 50 MG: 50 TABLET ORAL at 21:29

## 2024-05-12 RX ADMIN — PANTOPRAZOLE SODIUM 40 MG: 40 TABLET, DELAYED RELEASE ORAL at 06:08

## 2024-05-12 RX ADMIN — HYDRALAZINE HYDROCHLORIDE 25 MG: 25 TABLET ORAL at 06:08

## 2024-05-12 ASSESSMENT — PAIN SCALES - PAIN ASSESSMENT IN ADVANCED DEMENTIA (PAINAD)
BODYLANGUAGE: RELAXED
BREATHING: NORMAL
TOTALSCORE: 0
FACIALEXPRESSION: SMILING OR INEXPRESSIVE
CONSOLABILITY: NO NEED TO CONSOLE

## 2024-05-12 ASSESSMENT — PAIN SCALES - GENERAL: PAINLEVEL_OUTOF10: 0

## 2024-05-12 NOTE — PROGRESS NOTES
Daily Progress Note  5/12/2024    Patient Name: Esau Fan    CHIEF COMPLAINT:  Dementia with behavioral disturbance         SUBJECTIVE:      Patient seen face-to-face for follow-up assessment.  He has one-to-one sitter at side for safety, as patient is confused and wandering.  He requires frequent redirection.  Noted that he has been making sexual inappropriate statements.  Patient exhibits confabulation.  Easily distracted during our conversation and thought processes tangential at times.  Staff note that patient has been more irritable today.  He is not able to identify any reason as to why this would be.  He does feel that he is sleeping well overnight.  Noted that he utilized trazodone.  Patient has not required any emergency medications.  He was started on Exelon patch 4.6 mg.  We reviewed for any side effects and he denies all.  Patient denies any perceptual disturbances.  Does not appear to be responding to internal stimuli.  He makes bizarre comments that he has thoughts to harm people, but denies intent or plan.  Patient then laughs.  He notes that his mood has been low at times, but denies any thoughts to harm himself.  Per H&P, it is noted that patient has been making suicidal statements and he does have access to a firearm at home.  Will order low-dose mirtazapine to help with mood.    Patient has yet to demonstrate stability.  Requires inpatient hospitalization for safety.    Appetite:  [x] Adequate/Unchanged  [] Increased  [] Decreased      Sleep:       [x] Adequate/Unchanged  [] Fair  [] Poor      Group Attendance on Unit:   [] Yes   [] Selectively    [x] No    Compliant with scheduled medications: [x] Yes  [] No    Received emergency medications in past 24 hrs: [] Yes   [x] No    Medication Side Effects: Denies         Mental Status Exam  Level of consciousness: Alert and awake   Appearance: Appropriate attire for setting, seated in chair, with fair  grooming and hygiene   Behavior/Motor:

## 2024-05-12 NOTE — BH NOTE
Patient given quit line phone number [phone] at this time, refusing to call at this time, states \" I just don't want to quit now\"-  dangers of longterm tobacco use discussed.  staff will continue to reinforce importance of smoking cessation.

## 2024-05-12 NOTE — BH NOTE
Pt can be sexually inappropriate at times. Writer has asked Pt to unbutton shirt to apply nicotine patch on Pt arm. Pt asks writer, \"do I get to play with your chest next since you're  asking to see my chest?\" Writer therapeutically ignores Pt.

## 2024-05-12 NOTE — PLAN OF CARE
Pt becoming more confused, disorientated to time, place, situation. This is causing Pt to become irritable/agitated. Will continue to provide Pt 1:1, support and distraction. Pt can be sexually inappropriate at times. Writer had asked Pt to unbutton shirt to apply nicotine patch on Pt arm. Pt asks writer, \"do I get to play with your chest next since you're  asking to see my chest?\" Writer therapeutically ignores Pt.  1:1 continues for safety and to prevent Pt from wandering in Pt room.  Pt showered, changed into personal clothing. Pt given frequent reminders to use restroom. Pt is free of falls, pt is wearing non skid slippers, pt's room is clutter free.  Problem: Self Harm/Suicidality  Goal: Will have no self-injury during hospital stay  Description: INTERVENTIONS:  1.  Ensure constant observer at bedside with Q15M safety checks  2.  Maintain a safe environment  3.  Secure patient belongings  4.  Ensure family/visitors adhere to safety recommendations  5.  Ensure safety tray has been added to patient's diet order  6.  Every shift and PRN: Re-assess suicidal risk via Frequent Screener    Outcome: Progressing     Problem: Self Care Deficit  Goal: Return ADL status to a safe level of function  Description: INTERVENTIONS:  1. Administer medication as ordered  2. Assess ADL deficits and provide assistive devices as needed  3. Obtain PT/OT consults as needed  4. Assist and instruct patient to increase activity and self care as tolerated  Outcome: Progressing     Problem: Safety - Adult  Goal: Free from fall injury  Outcome: Progressing

## 2024-05-12 NOTE — PROGRESS NOTES

## 2024-05-12 NOTE — PROGRESS NOTES
Occupational Therapy  [facility]   Occupational Therapy Evaluation  Date: 24  Patient Name: Esau Fan       Room: 0205/0205-01  MRN: [id number]  Account: [id number]   : 1950  (74 y.o.) Gender: male     Discharge Recommendations:  The patient may need non-skilled ADL assistance after discharge.     OT Equipment Recommendations  Other: TBD    Referring Practitioner: Amarjit Hebert MD  Diagnosis: Dementia with behavioral disturbance   Additional Pertinent Hx: Per chart: Esau Fan is a 74 y.o. male who presents to the emergency department from home, as a walk in to the ED lobby for evaluation of altered mental status.  Patient's wife reports that the patient was diagnosed with early onset dementia 2 years ago.  She states however that last night when he woke up from a nap he was more confused than normal.  She reports that he thought that they still lived in a house they lived in 30 years prior.  Wife reports that she took him by to show him the old house however he was still confused.  Wife reports that he also was not able to recognize her.  Wife reports that they then went home and sat in a recliner all night.  She denies the patient getting any sleep last night.  She reports that today when getting in the car he asked where she was again.  Mother states that this is normal for him.  Mother states that he is also had suicidal thoughts at times.  Patient currently denies any suicidal ideation however he reports he has had thoughts earlier today.  He stated that he would \"eat a .357\".  Wife reports that he does have access to a gun.  Patient currently denies any pain.  He reports that he is here due to disagreements with his wife.  He denies any pain or burning with urination.  Denies any chest pain shortness of breath.  Denies any recent trauma.  The patient has no other acute complaints at this time.    Treatment Diagnosis: impaired self care status    Past Medical  the cleaning, laundry and grocery shopping; states the kids and grandkids all pitch in)  Ambulation Assistance: Independent (no device)  Transfer Assistance: Independent  Active : Yes  Mode of Transportation: Car  Occupation: Retired  Leisure & Hobbies: music  IADL Comments: sleeps in an adjustable bed  Additional Comments: pt is a poor historian- ? reliability of above information, no family present to verify; pt states that his spouse is still working full time as a RN at [employer]      Objective  Orientation  Overall Orientation Status: Impaired  Orientation Level: Oriented to person, Oriented to place, Disoriented to time, Disoriented to situation  Cognition  Overall Cognitive Status: Exceptions  Arousal/Alertness: Delayed responses to stimuli  Following Commands: Follows multistep commands with repitition, Follows multistep commands with increased time  Attention Span: Difficulty attending to directions  Memory: Decreased recall of recent events  Safety Judgement: Decreased awareness of need for assistance, Decreased awareness of need for safety  Problem Solving: Assistance required to correct errors made, Assistance required to identify errors made, Assistance required to implement solutions, Assistance required to generate solutions  Insights: Decreased awareness of deficits  Initiation: Requires cues for some  Sequencing: Requires cues for some  Cognition Comment: hx Dementia; pt requires cues for processing ADLs this date   Sensation  Overall Sensation Status: WFL (pt denies)    ADL  Feeding: Supervision, Based on clinical judgement  Grooming: Stand by assistance, Based on clinical judgement  UE Bathing: Stand by assistance, Based on clinical judgement  LE Bathing: Contact guard assistance, Based on clinical judgement  UE Dressing: Stand by assistance, Based on clinical judgement  LE Dressing: Contact guard assistance, Based on clinical judgement  LE Dressing Skilled Clinical Factors: seated

## 2024-05-12 NOTE — GROUP NOTE
Group Therapy Note    Date: 5/12/2024    Group Start Time: 1000  Group End Time: 1020  Group Topic: Psychotherapy     Mariana Tate MSW        Group Therapy Note    Attendees: 0/13     Patient was offered group therapy today but declined to participate despite encouragement from staff.  1:1 was offered.    Discipline Responsible: /Counselor      Signature:  LAKE Duque

## 2024-05-12 NOTE — GROUP NOTE
Group Therapy Note    Date: 5/12/2024    Group Start Time: 1330  Group End Time: 1400  Group Topic: Cognitive Skills    Kiley Toledo CTRS        Group Therapy Note    Attendees: 1/13    Cognitive Skills Group Note        Date: May 12, 2024    Start Time: 1:30pm  End Time: 2pm      Number of Participants in Group & Unit Census:  1/13    Topic:  interpersonal skills, memory recall, self-expression     Goal of Group: To improve interpersonal skills and memory recall through collaborating with peers and demonstrating self-expression.      Comments:     Patient did not participate in Cognitive Skills group, despite staff encouragement and explanation of benefits.  Patient remain seclusive to self.  Q15 minute safety checks maintained for patient safety and will continue to encourage patient to attend unit programming.        Signature:  BOZENA Rodriguez

## 2024-05-12 NOTE — BH NOTE
Pt personal clothing given back to Pt.  Pt is awake, laying in bed- pleasant,  socializing with assigned 1:1.  Writer encourages Pt to shower before changing into clean clothing.  Toiletries/towels at bedside.

## 2024-05-12 NOTE — BH NOTE
Patient given tobacco quitline number 27429566751 at this time, refusing to call at this time, states \" I just dont want to quit now\"- patient given information as to the dangers of long term tobacco use. Continue to reinforce the importance of tobacco cessation.

## 2024-05-12 NOTE — BH NOTE
Pt 1:1 approaches nurse's station stating, \"the patient has been  mentioning wanting to smoke a cigarette... Could he have a patch or something?\"      Telephone with readback order received Nicoderm 14 mg/24 hr patch.

## 2024-05-12 NOTE — PROGRESS NOTES
Physical Therapy  Facility/Department: [facility]  Physical Therapy Initial Assessment    Name: Esau Fan  : 1950  MRN: [id number]  Date of Service: 2024    Discharge Recommendations:  Continue to assess pending progress   PT Equipment Recommendations  Equipment Needed:  (TBD)      Patient Diagnosis(es): There were no encounter diagnoses.  Past Medical History:  has a past medical history of Carpal tunnel syndrome on left, Dyspepsia, GERD (gastroesophageal reflux disease), Hiatal hernia, HTN (hypertension), Neuroma, S/P arthroscopy, Smoking history, Solitary pulmonary nodule, and Tinnitus.  Past Surgical History:  has no past surgical history on file.    Assessment   Body Structures, Functions, Activity Limitations Requiring Skilled Therapeutic Intervention: Decreased functional mobility ;Decreased endurance;Decreased strength;Decreased safe awareness;Decreased balance  Assessment: continue per POC to maxmize potential for safe D/C  Treatment Diagnosis: impaired mobility due to weakness  Specific Instructions for Next Treatment: advance gait distance, instruct in exercise program  Therapy Prognosis: Fair  Decision Making: Medium Complexity  History: pt admitted due to depression w/ SI  Exam: ROM, MMT, balance and mobility assessment  Clinical Presentation: independent bed mobility, supervision sit <> stand, SBA for gait without AD 45' and 60'; has sitter  Barriers to Learning: dementia  Requires PT Follow-Up: Yes  Activity Tolerance  Activity Tolerance: Treatment limited secondary to decreased cognition     Plan   Physical Therapy Plan  General Plan:  (2-3 treatments/ week)  Specific Instructions for Next Treatment: advance gait distance, instruct in exercise program  Current Treatment Recommendations: Strengthening, Balance training, Functional mobility training, Transfer training, Endurance training, Gait training, Safety education & training, Home exercise program, Patient/Caregiver education &  walking in hospital room?: A Little  How much help is needed climbing 3-5 steps with a railing?: Total  AM-PAC Inpatient Mobility Raw Score : 18  AM-PAC Inpatient T-Scale Score : 43.63  Mobility Inpatient CMS 0-100% Score: 46.58  Mobility Inpatient CMS G-Code Modifier : CK            Goals  Short Term Goals  Time Frame for Short Term Goals: 2-3 treatments/ week  Short Term Goal 1: pt to demonstrate good technique for exercise program for general strengthening and balance  Short Term Goal 2: pt to demonstrate independent transfers from various surfaces  Short Term Goal 3: pt to demonstrate independent ambulation 200' for community distances  Short Term Goal 4: pt to demonstrate good ambulatory balance  Patient Goals   Patient Goals : return home w/ spouse       Education  Patient Education  Education Given To: Patient  Education Provided: Role of Therapy;Plan of Care  Education Method: Demonstration;Verbal  Barriers to Learning: Cognition  Education Outcome: Continued education needed      Therapy Time   Individual Concurrent Group Co-treatment   Time In 0938         Time Out 1001         Minutes 23         Timed Code Treatment Minutes: 8 Minutes       Ronda Herrera, PT

## 2024-05-12 NOTE — BH NOTE
Pt becoming more confused, disorientated to time, place, situation. This is causing Pt to become irritable/agitated. Will continue to provide Pt 1:1, support and distraction.

## 2024-05-13 PROCEDURE — APPSS30 APP SPLIT SHARED TIME 16-30 MINUTES: Performed by: NURSE PRACTITIONER

## 2024-05-13 PROCEDURE — 6370000000 HC RX 637 (ALT 250 FOR IP): Performed by: NURSE PRACTITIONER

## 2024-05-13 PROCEDURE — 97530 THERAPEUTIC ACTIVITIES: CPT

## 2024-05-13 PROCEDURE — 6370000000 HC RX 637 (ALT 250 FOR IP)

## 2024-05-13 PROCEDURE — 99232 SBSQ HOSP IP/OBS MODERATE 35: CPT | Performed by: PSYCHIATRY & NEUROLOGY

## 2024-05-13 PROCEDURE — 6370000000 HC RX 637 (ALT 250 FOR IP): Performed by: INTERNAL MEDICINE

## 2024-05-13 PROCEDURE — 1240000000 HC EMOTIONAL WELLNESS R&B

## 2024-05-13 PROCEDURE — 99232 SBSQ HOSP IP/OBS MODERATE 35: CPT | Performed by: INTERNAL MEDICINE

## 2024-05-13 PROCEDURE — 97535 SELF CARE MNGMENT TRAINING: CPT

## 2024-05-13 RX ORDER — MEMANTINE HYDROCHLORIDE 5 MG/1
5 TABLET ORAL 2 TIMES DAILY
Status: DISCONTINUED | OUTPATIENT
Start: 2024-05-14 | End: 2024-05-18 | Stop reason: HOSPADM

## 2024-05-13 RX ADMIN — PANTOPRAZOLE SODIUM 40 MG: 40 TABLET, DELAYED RELEASE ORAL at 06:12

## 2024-05-13 RX ADMIN — TRAZODONE HYDROCHLORIDE 50 MG: 50 TABLET ORAL at 21:17

## 2024-05-13 RX ADMIN — HYDRALAZINE HYDROCHLORIDE 25 MG: 25 TABLET ORAL at 06:12

## 2024-05-13 RX ADMIN — MIRTAZAPINE 7.5 MG: 15 TABLET, FILM COATED ORAL at 21:17

## 2024-05-13 RX ADMIN — HYDRALAZINE HYDROCHLORIDE 25 MG: 25 TABLET ORAL at 21:18

## 2024-05-13 RX ADMIN — HYDRALAZINE HYDROCHLORIDE 25 MG: 25 TABLET ORAL at 14:11

## 2024-05-13 RX ADMIN — RISPERIDONE 0.5 MG: 0.5 TABLET, FILM COATED ORAL at 21:41

## 2024-05-13 NOTE — PROGRESS NOTES
[facility]   INPATIENT OCCUPATIONAL THERAPY  PROGRESS NOTE  Date: 2024  Patient Name: Esau Fan       Room: 0205/0205-01  MRN: [id number]    : 1950  (74 y.o.)  Gender: male   Referring Practitioner: Amarjit Hebert MD  Diagnosis: Dementia with behavioral disturbance      Discharge Recommendations:  The patient may need non-skilled ADL assistance after discharge.     OT Equipment Recommendations  Other: TBD- May not benefit from education on DME due to hx of dementia    Restrictions/Precautions  Restrictions/Precautions  Restrictions/Precautions: Fall Risk;Up as Tolerated  Required Braces or Orthoses?: No  Implants present? : Metal implants (IM russ tibia, shrapnel from war)    O2 Device: None (Room air)    Subjective  Subjective  Subjective: \"You're not really doing a whole lot to encourage me\" Patient requires max simple commands and explanation of activity to participate in session. Patient would attempt to deflect the conversation throughout the session  Subjective  Pain: Pt reported \"headache\" but would not rate, stating \"It's not a big deal anyway\"  Comments: Okay for OT treatment this date    Objective  Cognition  Overall Cognitive Status: Exceptions  Arousal/Alertness: Appropriate responses to stimuli  Following Commands: Follows one step commands with increased time;Follows one step commands with repetition  Attention Span: Difficulty attending to directions;Attends with cues to redirect  Memory: Decreased recall of recent events  Safety Judgement: Decreased awareness of need for assistance;Decreased awareness of need for safety  Problem Solving: Assistance required to implement solutions;Assistance required to generate solutions  Insights: Decreased awareness of deficits  Initiation: Requires cues for some  Sequencing: Requires cues for some  Cognition Comment: Hx of dementia; benefits from simple, consistent commands with occasional explanation of  is needed for bathing (which includes washing, rinsing, drying)?: A Little  How much help is needed for toileting (which includes using toilet, bedpan, or urinal)?: A Little  How much help is needed for putting on and taking off regular upper body clothing?: A Little  How much help is needed for taking care of personal grooming?: A Little  How much help for eating meals?: A Little  AM-[facility] Inpatient Daily Activity Raw Score: 18  AM-PAC Inpatient ADL T-Scale Score : 38.66  ADL Inpatient CMS 0-100% Score: 46.65  ADL Inpatient CMS G-Code Modifier : CK    OT Minutes  OT Individual Minutes  Time In: 1018  Time Out: 1041  Minutes: 23  Time Code Minutes   Timed Code Treatment Minutes: 23 Minutes      Electronically signed by INNA Flores on 5/13/24 at 12:59 PM EDT

## 2024-05-13 NOTE — GROUP NOTE
Group Therapy Note    Date: 5/13/2024    Group Start Time: 1430  Group End Time: 1510  Group Topic: Relaxation    STCZ Kiley Pepe CTRS        Group Therapy Note    Attendees: 5/16    Relaxation Group Note        Date: May 13, 2024           Start Time: 2:30pm  End Time: 3:10pm      Number of Participants in Group & Unit Census:  5/16    Topic: interpersonal skills, leisure awareness, creative expression     Goal of Group: To improve interpersonal skills and leisure awareness through collaborating with peers and demonstrating creative expression.      Comments:     Patient did not participate in Relaxation group, despite staff encouragement and explanation of benefits.  Patient remain seclusive to self.  Q15 minute safety checks maintained for patient safety and will continue to encourage patient to attend unit programming.        Signature:  BOZENA Rodriguez

## 2024-05-13 NOTE — PROGRESS NOTES
DATE: 2024    NAME: Esau Fan  MRN: [id number]   : 1950    Patient not seen this date for Physical Therapy due to:      [] Cancel by RN or physician due to:    [] Hemodialysis    [] Critical Lab Value Level     [] Blood transfusion in progress    [] Acute or unstable cardiovascular status   _MAP < 55 or more than >115  _HR < 40 or > 130    [] Acute or unstable pulmonary status   -FiO2 > 60%   _RR < 5 or >40    _O2 sats < 85%    [] Strict Bedrest    [] Off Unit for surgery or procedure    [x] Off Unit for testing, EGD       [] Pending imaging to R/O fracture    [] Refusal by Patient      [] Other      [] PT being discontinued at this time. Patient independent. No further needs.     [] PT being discontinued at this time as the patient has been transferred to hospice care. No further needs.      Belinda Wright, PTA

## 2024-05-13 NOTE — PLAN OF CARE
Problem: Self Harm/Suicidality  Goal: Will have no self-injury during hospital stay  Description: INTERVENTIONS:  1.  Ensure constant observer at bedside with Q15M safety checks  2.  Maintain a safe environment  3.  Secure patient belongings  4.  Ensure family/visitors adhere to safety recommendations  5.  Ensure safety tray has been added to patient's diet order  6.  Every shift and PRN: Re-assess suicidal risk via Frequent Screener    5/13/2024 0220 by Lety Estevez RN  Outcome: Progressing   Patient remains free from self harm at this time. 1:1 continued for patient safety.  Problem: Behavior  Goal: Pt/Family maintain appropriate behavior and adhere to behavioral management agreement, if implemented  Description: INTERVENTIONS:  1. Assess patient/family's coping skills and  non-compliant behavior (including use of illegal substances)  2. Notify security of behavior or suspected illegal substances which indicate the need for search of the family and/or belongings  3. Encourage verbalization of thoughts and concerns in a socially appropriate manner  4. Utilize positive, consistent limit setting strategies supporting safety of patient, staff and others  5. Encourage participation in the decision making process about the behavioral management agreement  6. If a visitor's behavior poses a threat to safety call refer to organization policy.  7. Initiate consult with , Psychosocial CNS, Spiritual Care as appropriate  Outcome: Progressing   Patient exhibited some confusion throughout the shift and was only oriented to self. Patient wanders the unit and sits by the phones but is redirectable. Patient is medication compliant and behavior controlled.   Problem: Sleep Disturbance  Goal: Will exhibit normal sleeping pattern  Description: INTERVENTIONS:  1. Administer medication as ordered  2. Decrease environmental stimuli, including noise, as appropriate  3. Discourage social isolation and naps during

## 2024-05-13 NOTE — GROUP NOTE
Group Therapy Note    Date: 5/13/2024    Group Start Time: 1100  Group End Time: 1140  Group Topic: Cognitive Skills    Kiley Toledo CTRS        Group Therapy Note    Attendees: 6/16    Cognitive Skills Group Note        Date: May 13, 2024       Start Time: 11am  End Time: 11:40      Number of Participants in Group & Unit Census:  6/16    Topic:  interpersonal skills, decision-making, self-expression     Goal of Group: To improve interpersonal skills and decision-making through collaborating with peers and demonstrating self-expression.      Comments:     Patient did not participate in Cognitive Skills group, despite staff encouragement and explanation of benefits.  Patient remain seclusive to self.  Q15 minute safety checks maintained for patient safety and will continue to encourage patient to attend unit programming.        Signature:  BOZENA Rodriguez

## 2024-05-13 NOTE — PLAN OF CARE
Problem: Safety - Adult  Goal: Free from fall injury  Outcome: Progressing     Problem: Self Harm/Suicidality  Goal: Will have no self-injury during hospital stay  Description: INTERVENTIONS:  1.  Ensure constant observer at bedside with Q15M safety checks  2.  Maintain a safe environment  3.  Secure patient belongings  4.  Ensure family/visitors adhere to safety recommendations  5.  Ensure safety tray has been added to patient's diet order  6.  Every shift and PRN: Re-assess suicidal risk via Frequent Screener    5/13/2024 0956 by Santino Martini LPN  Outcome: Progressing

## 2024-05-13 NOTE — GROUP NOTE
Group Therapy Note    Date: 5/13/2024    Group Start Time: 1000  Group End Time: 1015  Group Topic: Psychoeducation    CZ Marycarmen Nazario, DAPHNEW        Group Therapy Note    Attendees: 1/16       patient refused to attend psychoeducation group at 10a after encouragement from staff.  1:1 talk time provided as alternative to group session

## 2024-05-13 NOTE — PROGRESS NOTES
Daily Progress Note  5/13/2024    Patient Name: Esau Fan    CHIEF COMPLAINT:  Dementia with behavioral disturbance         SUBJECTIVE:    Esau was seen for follow-up assessment today.  He remains compliant with scheduled medications.  He has not required any emergency medications in the past 24 hours.  Patient remains on a one-to-one for patient's safety.  Nursing staff report patient continues to wander around the unit and can be intermittently irritable.  He requires frequent redirection.  He remains oriented to self only.  Patient was approached in the day area and he was agreeable to conversation in privacy of unit sensory room.  Patient expresses dissatisfaction with the noise level on the unit.  He continues to be hyperverbal during conversation with tangential and frequently disorganized thought process.  He continues to confabulate situations.  He believes that he has a big case coming up on Tuesday that he needs to be in court for.  He was religiously preoccupied today talking about being judged by God frequently or having to kill people in the line of duty when he was a  before becoming a .  Patient cannot identify his location or reason for admission.  He thought the month to be April or May and the year to be 2004.  He could not identify the exact date or day of the week.  Patient stated that he is no longer suicidal but his mood tends to fluctuate throughout the day.  He made no statements of wanting to harm others today.  At this time patient is unstable for discharge and is unable to care for himself independently and warrants further hospitalization for safety and stabilization.    Appetite:  [x] Adequate/Unchanged  [] Increased  [] Decreased      Sleep:       [x] Adequate/Unchanged  [] Fair  [] Poor      Group Attendance on Unit:   [] Yes   [] Selectively    [x] No    Compliant with scheduled medications: [x] Yes  [] No    Received emergency medications in past 24 hrs:

## 2024-05-13 NOTE — PROGRESS NOTES
Behavioral Services  Medicare Certification Upon Admission    I certify that this patient's inpatient psychiatric hospital admission is medically necessary for:    [x] (1) Treatment which could reasonably be expected to improve this patient's condition,       [x] (2) Or for diagnostic study;     AND     [x](2) The inpatient psychiatric services are provided while the individual is under the care of a physician and are included in the individualized plan of care.    Estimated length of stay/service 4-7 days    Plan for post-hospital care home with outpatient [facility] f/u    Electronically signed by STACEY CR MD on 5/13/2024 at 7:10 AM

## 2024-05-13 NOTE — PROGRESS NOTES
IN-PATIENT SERVICE  [facility] Internal Medicine    HISTORY AND PHYSICAL EXAMINATION/ CONSULT NOTE            Date:   5/13/2024  Patient name:  Esau Fan  Date of admission:  5/11/2024 11:10 AM  MRN:   [id number]  Account:  [id number]  YOB: 1950  PCP:    Balod Callejas DO  Room:   [address] [place] [address]  Code Status:    Full Code    Physician Requesting Consult: Amarjit Hebert MD    Reason for Consult: History and physical, medical management    Chief Complaint:     No chief complaint on file.    Medical management    History Obtained From:     Patient, EMR, nursing staff    History of Present Illness:     74-year-old male admitted for depression with suicidal ideation  Medical history relevant for hypertension, GERD  HTN  Onset more than 2 years ago  Shreya: mild to mod  Usually controlled with current po meds  Not associated with headaches or blurry vision  No chest pain      Past Medical History:     Past Medical History:   Diagnosis Date   • Carpal tunnel syndrome on left    • Dyspepsia     and heartburn   • GERD (gastroesophageal reflux disease)    • Hiatal hernia    • HTN (hypertension) 10/03    isolated systolic   • Neuroma     right foot   • S/P arthroscopy     right thumb   • Smoking history 1969 to 2006    2 ppd   • Solitary pulmonary nodule    • Tinnitus         Past Surgical History:     No past surgical history on file.     Medications Prior to Admission:     Prior to Admission medications    Medication Sig Start Date End Date Taking? Authorizing Provider   hydrALAZINE (APRESOLINE) 25 MG tablet TAKE ONE TABLET BY MOUTH EVERY 8 HOURS 3/24/15   Baldo Callejas DO   tadalafil (CIALIS) 10 MG tablet Take 1 tablet by mouth as needed for Erectile Dysfunction. 9/2/14   Baldo Callejas DO   omeprazole (PRILOSEC) 20 MG capsule Take 1 capsule by mouth 2 times daily. 9/2/14   Baldo Callejas DO   omeprazole (PRILOSEC) 20 MG capsule TAKE ONE CAPSULE EVERY DAY 2/27/14   Baldo Callejas,

## 2024-05-14 PROCEDURE — 6370000000 HC RX 637 (ALT 250 FOR IP)

## 2024-05-14 PROCEDURE — 6370000000 HC RX 637 (ALT 250 FOR IP): Performed by: INTERNAL MEDICINE

## 2024-05-14 PROCEDURE — 99232 SBSQ HOSP IP/OBS MODERATE 35: CPT | Performed by: PSYCHIATRY & NEUROLOGY

## 2024-05-14 PROCEDURE — 6370000000 HC RX 637 (ALT 250 FOR IP): Performed by: PSYCHIATRY & NEUROLOGY

## 2024-05-14 PROCEDURE — 99231 SBSQ HOSP IP/OBS SF/LOW 25: CPT | Performed by: INTERNAL MEDICINE

## 2024-05-14 PROCEDURE — APPSS30 APP SPLIT SHARED TIME 16-30 MINUTES: Performed by: NURSE PRACTITIONER

## 2024-05-14 PROCEDURE — 6370000000 HC RX 637 (ALT 250 FOR IP): Performed by: NURSE PRACTITIONER

## 2024-05-14 PROCEDURE — 97116 GAIT TRAINING THERAPY: CPT

## 2024-05-14 PROCEDURE — 1240000000 HC EMOTIONAL WELLNESS R&B

## 2024-05-14 RX ADMIN — PANTOPRAZOLE SODIUM 40 MG: 40 TABLET, DELAYED RELEASE ORAL at 06:31

## 2024-05-14 RX ADMIN — HYDRALAZINE HYDROCHLORIDE 25 MG: 25 TABLET ORAL at 20:29

## 2024-05-14 RX ADMIN — MEMANTINE HYDROCHLORIDE 5 MG: 5 TABLET ORAL at 20:29

## 2024-05-14 RX ADMIN — MEMANTINE HYDROCHLORIDE 5 MG: 5 TABLET ORAL at 09:11

## 2024-05-14 RX ADMIN — TRAZODONE HYDROCHLORIDE 50 MG: 50 TABLET ORAL at 20:30

## 2024-05-14 RX ADMIN — MIRTAZAPINE 7.5 MG: 15 TABLET, FILM COATED ORAL at 20:30

## 2024-05-14 RX ADMIN — HYDRALAZINE HYDROCHLORIDE 25 MG: 25 TABLET ORAL at 15:13

## 2024-05-14 RX ADMIN — RISPERIDONE 0.5 MG: 0.5 TABLET, FILM COATED ORAL at 20:30

## 2024-05-14 NOTE — PROGRESS NOTES
IN-PATIENT SERVICE  [facility] Internal Medicine    HISTORY AND PHYSICAL EXAMINATION/ CONSULT NOTE            Date:   5/14/2024  Patient name:  Esau Fan  Date of admission:  5/11/2024 11:10 AM  MRN:   [id number]  Account:  [id number]  YOB: 1950  PCP:    Baldo Callejas DO  Room:   [address] [place] [address]  Code Status:    Full Code    Physician Requesting Consult: Amarjit Hebert MD    Reason for Consult: History and physical, medical management    Chief Complaint:     No chief complaint on file.    Medical management    History Obtained From:     Patient, EMR, nursing staff    History of Present Illness:     74-year-old male admitted for depression with suicidal ideation  Medical history relevant for hypertension, GERD  HTN  Onset more than 2 years ago  Shreya: mild to mod  Usually controlled with current po meds  Not associated with headaches or blurry vision  No chest pain      Past Medical History:     Past Medical History:   Diagnosis Date   • Carpal tunnel syndrome on left    • Dyspepsia     and heartburn   • GERD (gastroesophageal reflux disease)    • Hiatal hernia    • HTN (hypertension) 10/03    isolated systolic   • Neuroma     right foot   • S/P arthroscopy     right thumb   • Smoking history 1969 to 2006    2 ppd   • Solitary pulmonary nodule    • Tinnitus         Past Surgical History:     No past surgical history on file.     Medications Prior to Admission:     Prior to Admission medications    Medication Sig Start Date End Date Taking? Authorizing Provider   hydrALAZINE (APRESOLINE) 25 MG tablet TAKE ONE TABLET BY MOUTH EVERY 8 HOURS 3/24/15   Baldo Callejas DO   tadalafil (CIALIS) 10 MG tablet Take 1 tablet by mouth as needed for Erectile Dysfunction. 9/2/14   Baldo Callejas DO   omeprazole (PRILOSEC) 20 MG capsule Take 1 capsule by mouth 2 times daily. 9/2/14   Baldo Callejas DO   omeprazole (PRILOSEC) 20 MG capsule TAKE ONE CAPSULE EVERY DAY 2/27/14   Baldo Callejas,  DO   tadalafil (CIALIS) 5 MG tablet Take 1 tablet by mouth as needed. 13   Baldo Callejas DO   Testosterone (STRIANT) 30 MG MISC Place 30 mg inside cheek 2 times daily. 13   Baldo Callejas DO        Allergies:     Pcn [penicillins]    Social History:     Tobacco:    reports that he has been smoking cigarettes. He started smoking about 46 years ago. He has a 36.0 pack-year smoking history. He has never used smokeless tobacco.  Alcohol:      reports no history of alcohol use.  Drug Use:  reports no history of drug use.    Family History:     No family history on file.    Review of Systems:     Positive and Negative as described in HPI.    Denies any shortness of breath or cough  Denies chest pain or palpitations  Denies abdominal pain, diarrhea vomiting  Denies any new numbness tremors or weakness.    10 point review of systems was negative other than mentioned above    Physical Exam:     /63   Pulse 67   Temp 98 °F (36.7 °C) (Temporal)   Resp 16   Ht 1.829 m (6' 0.01\")   Wt 93.4 kg (206 lb)   SpO2 100%   BMI 27.93 kg/m²   Temp (24hrs), Av.9 °F (36.6 °C), Min:97.7 °F (36.5 °C), Max:98 °F (36.7 °C)    No results for input(s): \"POCGLU\" in the last 72 hours.  No intake or output data in the 24 hours ending 24 1710    General Appearance:  alert, well appearing, and in no acute distress  Head:  normocephalic, atraumatic.  Eye: no icterus, redness, pupils equal and reactive, extraocular eye movements intact, conjunctiva clear  Ear: normal external ear, no discharge, hearing intact  Nose:  no drainage noted  Mouth: mucous membranes moist  Neck: supple, no carotid bruits, thyroid not palpable  Lungs: Bilateral equal air entry, clear to ausculation, no wheezing, rales or rhonchi, normal effort  Cardiovascular: normal rate, regular rhythm, no murmur, gallop, rub.  Abdomen: Soft, nontender, nondistended, normal bowel sounds, no hepatomegaly or splenomegaly  Neurologic: There are no new focal

## 2024-05-14 NOTE — BH NOTE
PRN medication of oral Risperdal 0.5 mg effective as evidence by patient regaining behavioral control and currently talking with 1:1 staff. Close monitoring continues.

## 2024-05-14 NOTE — PLAN OF CARE
Problem: Self Harm/Suicidality  Goal: Will have no self-injury during hospital stay  Description: INTERVENTIONS:  1.  Ensure constant observer at bedside with Q15M safety checks  2.  Maintain a safe environment  3.  Secure patient belongings  4.  Ensure family/visitors adhere to safety recommendations  5.  Ensure safety tray has been added to patient's diet order  6.  Every shift and PRN: Re-assess suicidal risk via Frequent Screener    5/13/2024 2130 by Jocelyne Carter LPN  Outcome: Progressing  Note: Patient denies suicidal ideations and thoughts of self-harm. 1:1 close monitoring continues for patient safety.      Problem: Behavior  Goal: Pt/Family maintain appropriate behavior and adhere to behavioral management agreement, if implemented  Description: INTERVENTIONS:  1. Assess patient/family's coping skills and  non-compliant behavior (including use of illegal substances)  2. Notify security of behavior or suspected illegal substances which indicate the need for search of the family and/or belongings  3. Encourage verbalization of thoughts and concerns in a socially appropriate manner  4. Utilize positive, consistent limit setting strategies supporting safety of patient, staff and others  5. Encourage participation in the decision making process about the behavioral management agreement  6. If a visitor's behavior poses a threat to safety call refer to organization policy.  7. Initiate consult with , Psychosocial CNS, Spiritual Care as appropriate  5/13/2024 2130 by Jocelyne Carter LPN  Outcome: Progressing  Note: Patient is oriented to self but disoriented to time, place and situation. Patient is cooperative with staff at times but needs frequent redirection due to increased confusion. Patient encouraged to explore coping skills for reducing anxiety. Patient is hyper verbal with 1:1 staff throughout evening, his thought process is tangential. Patient is medication compliant this shift.       Problem: Safety - Adult  Goal: Free from fall injury  5/13/2024 2130 by Jocelyne Carter LPN  Outcome: Progressing  Note: Patient wearing non-skid footwear gait steady and he remains free from falls. Patient encouraged to seek staff assistance when needed.

## 2024-05-14 NOTE — BH NOTE
Patient agitated AEB arguing with staff about his wife's whereabouts. Patient states \"She's here, I know she is I don't believe you!\" Patient then begins to gather items he has in his room and states \"I need to get on the road, I have a court case in the morning.\" Staff attempt to relieve stress by encouraging patient to call wife and offered 1:1 talk time.Patient non-accepting of staff redirection and orientation. Oral PRN Risperdal 0.5 mg offered and accepted at this time. 1:1 close monitoring maintained for patient safety..

## 2024-05-14 NOTE — PROGRESS NOTES
Physical Therapy  Facility/Department: [facility]  Daily Treatment Note  NAME: Esau Fan  : 1950  MRN: [id number]    Date of Service: 2024    Discharge Recommendations:  Continue to assess pending progress   PT Equipment Recommendations  Equipment Needed:  (TBD)    Patient Diagnosis(es): There were no encounter diagnoses.    Assessment   Activity Tolerance: Treatment limited secondary to decreased cognition  Equipment Needed:  (TBD)     Plan    Physical Therapy Plan  General Plan:  (2-3 treatments/ week)  PT Plan of Care:  (2-3 treatments/ week)     Restrictions  Restrictions/Precautions  Restrictions/Precautions: Fall Risk, Up as Tolerated  Required Braces or Orthoses?: No  Implants present? : Metal implants (IM russ tibia, shrapnel from war)     Subjective    Subjective  Subjective: Pt standing bedside, getting dressed with PCT, Pt is pleasant and agreeable to tx. Pt with intermittent confusion on purpose of tx, requiring constant reminders of purpose being for physical therapy. Pt  stating \"I think you're really good looking, bbut  I cannot cheat on my wife\". Pt requiring a lot of redirection to tx.  Pain: Pt reported 10/10 \"headache from that rylie that came into the room, but I'm not complaining about it\"  Orientation  Overall Orientation Status: Impaired  Cognition  Overall Cognitive Status: Exceptions  Arousal/Alertness: Appropriate responses to stimuli  Following Commands: Follows one step commands with increased time;Follows one step commands with repetition  Attention Span: Difficulty attending to directions;Attends with cues to redirect  Memory: Decreased recall of recent events  Safety Judgement: Decreased awareness of need for assistance;Decreased awareness of need for safety  Problem Solving: Assistance required to implement solutions;Assistance required to generate solutions  Insights: Decreased awareness of deficits  Initiation: Requires cues for some  Sequencing: Requires cues for

## 2024-05-14 NOTE — GROUP NOTE
Group Therapy Note    Date: 5/14/2024    Group Start Time: 1100  Group End Time: 1140  Group Topic: Psychoeducation    Kiley Toledo CTRS        Group Therapy Note    Attendees: 3/17    Psych-Ed/Relapse Prevention Group Note        Date: May 14, 2024       Start Time: 11am  End Time: 11:40am      Number of Participants in Group & Unit Census:  3/17    Topic:  interpersonal skills, coping skills, self-expression    Goal of Group: To improve interpersonal skills and coping skills awareness through collaborating with peers and demonstrating self-expression.      Comments:     Patient did not participate in Psych-Ed/Relapse Prevention group, despite staff encouragement and explanation of benefits.  Patient remain seclusive to self.  Q15 minute safety checks maintained for patient safety and will continue to encourage patient to attend unit programming.        Signature:  BOZENA Rodriguez

## 2024-05-14 NOTE — PROGRESS NOTES
Daily Progress Note  5/14/2024    Patient Name: Esau Fan    CHIEF COMPLAINT:  Dementia with behavioral disturbance         SUBJECTIVE:    Esau was seen for follow-up assessment today.  He remains compliant with scheduled medications.  He has not required any emergency medications in the past 24 hours.  Observation has been changed to a line of sight in the day area.  Nursing staff report patient has been cooperative and redirectable.  He wanders the unit frequently.  Patient was approached in the day area.  He was observed to be engaging in restless movement and was fidgeting with his clothing, which she was pulling out of a bag.  He would repeatedly put his hands in the clothing or the tissue box and seemed to be confused and preoccupied but this was not causing him any distress.  He was a little more irritable today and had little interest in engaging in conversation with writer.  He continues to believe that his wife is coming to pick him up to take him home today.  He again discussed paperwork that she is bringing him for a case that he is working on.  Patient stated periodically that he wants to go home.  He denied suicidal and homicidal ideation today.  Patient remains only oriented to self.    At this time patient is unstable for discharge and is unable to care for himself independently and warrants further hospitalization for safety and stabilization.    Appetite:  [x] Adequate/Unchanged  [] Increased  [] Decreased      Sleep:       [x] Adequate/Unchanged  [] Fair  [] Poor      Group Attendance on Unit:   [] Yes   [] Selectively    [x] No    Compliant with scheduled medications: [x] Yes  [] No    Received emergency medications in past 24 hrs: [] Yes   [x] No    Medication Side Effects: Denies         Mental Status Exam  Level of consciousness: Alert and awake   Appearance: Appropriate attire for setting, seated in chair, with fair  grooming and hygiene   Behavior/Motor: Approachable, engages with

## 2024-05-14 NOTE — PLAN OF CARE
Problem: Behavior  Goal: Pt/Family maintain appropriate behavior and adhere to behavioral management agreement, if implemented  Description: INTERVENTIONS:  1. Assess patient/family's coping skills and  non-compliant behavior (including use of illegal substances)  2. Notify security of behavior or suspected illegal substances which indicate the need for search of the family and/or belongings  3. Encourage verbalization of thoughts and concerns in a socially appropriate manner  4. Utilize positive, consistent limit setting strategies supporting safety of patient, staff and others  5. Encourage participation in the decision making process about the behavioral management agreement  6. If a visitor's behavior poses a threat to safety call refer to organization policy.  7. Initiate consult with , Psychosocial CNS, Spiritual Care as appropriate  Outcome: Progressing     Problem: Self Care Deficit  Goal: Return ADL status to a safe level of function  Description: INTERVENTIONS:  1. Administer medication as ordered  2. Assess ADL deficits and provide assistive devices as needed  3. Obtain PT/OT consults as needed  4. Assist and instruct patient to increase activity and self care as tolerated  Outcome: Progressing

## 2024-05-14 NOTE — GROUP NOTE
Group Therapy Note    Date: 5/14/2024    Group Start Time: 1330  Group End Time: 1410  Group Topic: Cognitive Skills    Kiley Toledo CTRS        Group Therapy Note    Attendees: 2/16    Cognitive Skills Group Note        Date: May 14, 2024    Start Time: 1:30pm  End Time: 2:10pm      Number of Participants in Group & Unit Census:  2/16    Topic:  interpersonal skills, memory recall, concentration     Goal of Group: To improve interpersonal skills and memory recall through collaborating with peers and concentrating on a presented task.       Comments:     Patient did not participate in Cognitive Skills group, despite staff encouragement and explanation of benefits.  Patient remain seclusive to self.  Q15 minute safety checks maintained for patient safety and will continue to encourage patient to attend unit programming.        Signature:  BOZENA Rodriguez

## 2024-05-15 PROCEDURE — 6370000000 HC RX 637 (ALT 250 FOR IP)

## 2024-05-15 PROCEDURE — 6370000000 HC RX 637 (ALT 250 FOR IP): Performed by: PSYCHIATRY & NEUROLOGY

## 2024-05-15 PROCEDURE — 99232 SBSQ HOSP IP/OBS MODERATE 35: CPT | Performed by: PSYCHIATRY & NEUROLOGY

## 2024-05-15 PROCEDURE — APPSS30 APP SPLIT SHARED TIME 16-30 MINUTES: Performed by: NURSE PRACTITIONER

## 2024-05-15 PROCEDURE — 6370000000 HC RX 637 (ALT 250 FOR IP): Performed by: NURSE PRACTITIONER

## 2024-05-15 PROCEDURE — 1240000000 HC EMOTIONAL WELLNESS R&B

## 2024-05-15 PROCEDURE — 6370000000 HC RX 637 (ALT 250 FOR IP): Performed by: INTERNAL MEDICINE

## 2024-05-15 RX ADMIN — PANTOPRAZOLE SODIUM 40 MG: 40 TABLET, DELAYED RELEASE ORAL at 06:29

## 2024-05-15 RX ADMIN — HYDRALAZINE HYDROCHLORIDE 25 MG: 25 TABLET ORAL at 06:29

## 2024-05-15 RX ADMIN — HYDRALAZINE HYDROCHLORIDE 25 MG: 25 TABLET ORAL at 20:19

## 2024-05-15 RX ADMIN — TRAZODONE HYDROCHLORIDE 50 MG: 50 TABLET ORAL at 20:19

## 2024-05-15 RX ADMIN — MIRTAZAPINE 7.5 MG: 15 TABLET, FILM COATED ORAL at 20:19

## 2024-05-15 RX ADMIN — MEMANTINE HYDROCHLORIDE 5 MG: 5 TABLET ORAL at 09:11

## 2024-05-15 RX ADMIN — MEMANTINE HYDROCHLORIDE 5 MG: 5 TABLET ORAL at 20:19

## 2024-05-15 ASSESSMENT — PAIN SCALES - PAIN ASSESSMENT IN ADVANCED DEMENTIA (PAINAD)
BODYLANGUAGE: RELAXED
BREATHING: NORMAL
CONSOLABILITY: NO NEED TO CONSOLE
FACIALEXPRESSION: SMILING OR INEXPRESSIVE
TOTALSCORE: 0

## 2024-05-15 NOTE — PROGRESS NOTES
Daily Progress Note  5/15/2024    Patient Name: Esau Fan    CHIEF COMPLAINT:  Dementia with behavioral disturbance         SUBJECTIVE:      Patient seen face-to-face for follow-up assessment.  He is standing at the nurses station.  Appears confused.  Does not respond to questions appropriately and is preoccupied with his history is working as a  or a  of some kind.  Thought processes tangential with confabulations.  He is oriented to self only.  Continues to wander, but is more redirectable.  Continues on line of sight.  Staff did require use of oral risperidone 0.5 mg overnight secondary to agitation and wandering.  Patient has no understanding of situation or why he is in the hospital.  Selectively compliant with physical therapy, expressing need to get in contact with his wife.  He has been compliant with his scheduled psychotropic medication.  Current regimen includes Namenda 5 mg, Exelon patch 4.6 mg and Remeron 7.5 mg.  Patient did not express any side effects or concerns with the regimen.  Appears to be tolerating medications well.  Vitals are stable.  He does not express any suicidal or homicidal ideation.    At this time patient is unstable for discharge and is unable to care for himself independently and warrants further hospitalization for safety and stabilization.    Appetite:  [x] Adequate/Unchanged  [] Increased  [] Decreased      Sleep:       [x] Adequate/Unchanged  [] Fair  [] Poor      Group Attendance on Unit:   [] Yes   [] Selectively    [x] No    Compliant with scheduled medications: [x] Yes  [] No    Received emergency medications in past 24 hrs: [] Yes   [x] No    Medication Side Effects: Denies         Mental Status Exam  Level of consciousness: Alert and awake   Appearance: Appropriate attire for setting, standing, with fair  grooming and hygiene   Behavior/Motor: Approachable, distracted  Attitude toward examiner: Somewhat cooperative, attempts to be attentive,

## 2024-05-15 NOTE — GROUP NOTE
Group Therapy Note    Date: 5/15/2024    Group Start Time: 1000  Group End Time: 1050  Group Topic: Psychotherapy     Mariana Tate MSW        Group Therapy Note    Attendees: 4/17     Patient was offered group therapy today but declined to participate despite encouragement from staff.  1:1 was offered.    Discipline Responsible: /Counselor      Signature:  LAKE Duque

## 2024-05-15 NOTE — PLAN OF CARE
Problem: Safety - Adult  Goal: Free from fall injury  Outcome: Progressing  Note: Patient is oriented to self but disoriented to time, place and situation. Patient is cooperative with staff most of the time but needs constant redirection due to increased confusion. Safety maintained. Patient continues to be 1:1 for safety. Patient is hyper verbal with 1:1 staff throughout evening, his thought process is tangential. Patient is medication compliant this shift. No pain or discomfort noted.

## 2024-05-15 NOTE — BH NOTE
PRN medication of oral Risperdal 0.5 mg given, pt unable to be redirected and getting agitated effective as evidence by patient regaining behavioral control and currently in bed resting with eyes open talking with 1:1 staff.

## 2024-05-15 NOTE — PLAN OF CARE
Problem: Self Harm/Suicidality  Goal: Will have no self-injury during hospital stay  Description: INTERVENTIONS:  1.  Ensure constant observer at bedside with Q15M safety checks  2.  Maintain a safe environment  3.  Secure patient belongings  4.  Ensure family/visitors adhere to safety recommendations  5.  Ensure safety tray has been added to patient's diet order  6.  Every shift and PRN: Re-assess suicidal risk via Frequent Screener  5/15/2024 1028 by Jackelyn Olguin, RN  Outcome: Progressing    Problem: Behavior  Goal: Pt/Family maintain appropriate behavior and adhere to behavioral management agreement, if implemented  Description: INTERVENTIONS:  1. Assess patient/family's coping skills and  non-compliant behavior (including use of illegal substances)  2. Notify security of behavior or suspected illegal substances which indicate the need for search of the family and/or belongings  3. Encourage verbalization of thoughts and concerns in a socially appropriate manner  4. Utilize positive, consistent limit setting strategies supporting safety of patient, staff and others  5. Encourage participation in the decision making process about the behavioral management agreement  6. If a visitor's behavior poses a threat to safety call refer to organization policy.  7. Initiate consult with , Psychosocial CNS, Spiritual Care as appropriate  5/15/2024 1028 by Jackelyn Olguin, RN  Outcome: Progressing     Problem: Sleep Disturbance  Goal: Will exhibit normal sleeping pattern  Description: INTERVENTIONS:  1. Administer medication as ordered  2. Decrease environmental stimuli, including noise, as appropriate  3. Discourage social isolation and naps during the day  5/15/2024 1028 by Jackelyn Olguin, RN  Outcome: Progressing     Problem: Self Care Deficit  Goal: Return ADL status to a safe level of function  Description: INTERVENTIONS:  1. Administer medication as ordered  2. Assess ADL deficits and

## 2024-05-15 NOTE — PROGRESS NOTES
[facility]   OCCUPATIONAL THERAPY MISSED TREATMENT NOTE   [facility]  Date: 5/15/24  Patient Name: Esau Fan       Room: 0214/0214-01  MRN: [id number]   Account #: [id number]    : 1950  (74 y.o.)  Gender: male   Referring Practitioner: Amarjit Hebert MD  Diagnosis: Dementia with behavioral disturbance             REASON FOR MISSED TREATMENT:  Patient declined   -    Refusal by Patient pt refusing therapy this date stating \"I need to call my wife I don't have time right now.\" Will continue to follow for OT needs    Electronically signed by KATLIN Lozoya on 5/15/24 at 2:08 PM EDT

## 2024-05-15 NOTE — GROUP NOTE
Group Therapy Note    Date: 5/15/2024    Group Start Time: 1100  Group End Time: 1150  Group Topic: Cognitive Skills    Mesha Mcginnis CTRS        Group Therapy Note    Attendees: 4/17     Topic: To increase socialization and practice decision making , and communication skills      Patient did not attend or participate in Cognitive Skills Group at 11:00, despite staff encouragement   and explanation of benefits and modifications to task made available by RT.   Pt was seclusive to self and room .     Q15 minute safety checks maintained for patient safety and will continue to encourage   patient to attend unit programming.       Discipline Responsible: Psychoeducational Specialist  Signature:  BOZENA ROBERTO

## 2024-05-15 NOTE — GROUP NOTE
Group Therapy Note    Date: 5/15/2024    Group Start Time: 1430  Group End Time: 1545  Group Topic: Cognitive Skills    Mesha Mcginnis CTRS        Group Therapy Note    Attendees: 9/16     Topic: To increase socialization and practice self expression, and exploring positive   coping skills/resources/outlets using creative expression and discussion.        Patient did not participate in Cognitive Skills Group at 14:30, despite staff encouragement   and explanation of benefits and modifications to task made available by RT.   Pt was seclusive to self and room .     Q15 minute safety checks maintained for patient safety and will continue to encourage   patient to attend unit programming.       Discipline Responsible: Psychoeducational Specialist  Signature:  BOZENA ROBERTO

## 2024-05-16 PROCEDURE — 6370000000 HC RX 637 (ALT 250 FOR IP): Performed by: NURSE PRACTITIONER

## 2024-05-16 PROCEDURE — 6370000000 HC RX 637 (ALT 250 FOR IP): Performed by: INTERNAL MEDICINE

## 2024-05-16 PROCEDURE — 6370000000 HC RX 637 (ALT 250 FOR IP): Performed by: PSYCHIATRY & NEUROLOGY

## 2024-05-16 PROCEDURE — 97110 THERAPEUTIC EXERCISES: CPT

## 2024-05-16 PROCEDURE — 99232 SBSQ HOSP IP/OBS MODERATE 35: CPT | Performed by: PSYCHIATRY & NEUROLOGY

## 2024-05-16 PROCEDURE — APPSS30 APP SPLIT SHARED TIME 16-30 MINUTES: Performed by: NURSE PRACTITIONER

## 2024-05-16 PROCEDURE — 1240000000 HC EMOTIONAL WELLNESS R&B

## 2024-05-16 PROCEDURE — 97530 THERAPEUTIC ACTIVITIES: CPT

## 2024-05-16 PROCEDURE — 6370000000 HC RX 637 (ALT 250 FOR IP)

## 2024-05-16 PROCEDURE — 97116 GAIT TRAINING THERAPY: CPT

## 2024-05-16 RX ADMIN — HYDRALAZINE HYDROCHLORIDE 25 MG: 25 TABLET ORAL at 22:09

## 2024-05-16 RX ADMIN — MIRTAZAPINE 7.5 MG: 15 TABLET, FILM COATED ORAL at 22:09

## 2024-05-16 RX ADMIN — HYDRALAZINE HYDROCHLORIDE 25 MG: 25 TABLET ORAL at 06:52

## 2024-05-16 RX ADMIN — HYDRALAZINE HYDROCHLORIDE 25 MG: 25 TABLET ORAL at 14:18

## 2024-05-16 RX ADMIN — PANTOPRAZOLE SODIUM 40 MG: 40 TABLET, DELAYED RELEASE ORAL at 06:52

## 2024-05-16 RX ADMIN — TRAZODONE HYDROCHLORIDE 50 MG: 50 TABLET ORAL at 22:08

## 2024-05-16 RX ADMIN — MEMANTINE HYDROCHLORIDE 5 MG: 5 TABLET ORAL at 22:09

## 2024-05-16 RX ADMIN — MEMANTINE HYDROCHLORIDE 5 MG: 5 TABLET ORAL at 09:31

## 2024-05-16 NOTE — PROGRESS NOTES
Occupational Therapy  Facility/Department: [facility]  Rehabilitation Occupational Therapy Daily Treatment Note    Date: 24  Patient Name: Esau Fan       Room: 0214/0214-01  MRN: [id number]  Account: [id number]   : 1950  (74 y.o.) Gender: male      RN reports patient is medically stable for therapy treatment this date. Chart reviewed prior to treatment and patient is agreeable for therapy.  All lines intact and patient positioned comfortably at end of treatment.  All patient needs addressed prior to ending therapy session.      Due to recent hospitalization and medical condition, pt would benefit from additional intermittent skilled therapy at time of discharge.  Please refer to the AM-PAC score for current functional status.                Past Medical History:  has a past medical history of Carpal tunnel syndrome on left, Dyspepsia, GERD (gastroesophageal reflux disease), Hiatal hernia, HTN (hypertension), Neuroma, S/P arthroscopy, Smoking history, Solitary pulmonary nodule, and Tinnitus.  Past Surgical History:   has no past surgical history on file.    Restrictions  Restrictions/Precautions: Fall Risk, Up as Tolerated  Implants present? : Metal implants  Required Braces or Orthoses?: No    Subjective  Subjective: Pt sitting at chair talking on phone agreeable to OT after PCT assisted in encouraging pt for participation.  Restrictions/Precautions: Fall Risk;Up as Tolerated             Objective     Cognition  Overall Cognitive Status: Exceptions  Arousal/Alertness: Appropriate responses to stimuli  Following Commands: Follows one step commands with increased time;Follows one step commands with repetition  Attention Span: Difficulty attending to directions;Attends with cues to redirect  Memory: Decreased recall of recent events  Safety Judgement: Decreased awareness of need for assistance;Decreased awareness of need for safety  Problem Solving: Assistance required to implement solutions;Assistance  required to generate solutions  Insights: Decreased awareness of deficits  Initiation: Requires cues for some  Sequencing: Requires cues for some  Cognition Comment: Pt very talkative requiring frequent redirection back to task but pleasantly confused  Orientation  Overall Orientation Status: Impaired  Orientation Level: Oriented to person;Oriented to place;Disoriented to time;Disoriented to situation         ADL  Grooming/Oral Hygiene  Skilled Clinical Factors: Pt declined ADLs \"I don't wanna be watched.\"  Toileting  Skilled Clinical Factors: no needs          Functional Mobility  Assistance Level: Stand by assist  Skilled Clinical Factors: Pt out in common area upon arrival sitting in chair finishing talking on phone and then ambulated back to room w/o device with general unsteadiness but no major LOBs. Min VC's for pacing self.  Bed Mobility  Overall Assistance Level:  (N/T pt sitting in chair upon arrival and retired to chair at end of session)  Transfers  Surface: From chair without arms;To chair without arms  Additional Factors: Hand placement cues  Device:  (no device)  Sit to Stand  Assistance Level: Minimal assistance;Contact guard assist  Skilled Clinical Factors: Pt with difficulty standing from chair w/o arms having some posterior LOB. Min VC's for controlled sit/stand, nose over toes technique, and pursed lip breathing.  Stand to Sit  Assistance Level: Minimal assistance;Contact guard assist   OT Exercises  Exercise Treatment: Pt completed UB theraband exercises with green band for 1 set x 10 reps in all planes to increase UB strength for increased IND with ADL transfers. Required frequent redirection to task and Min rest breaks.     Assessment  Assessment  Activity Tolerance: Treatment limited secondary to decreased cognition  Discharge Recommendations: Patient would benefit from continued therapy after discharge  OT Equipment Recommendations  Other: TBD- May not benefit from education on DME due to hx

## 2024-05-16 NOTE — PROGRESS NOTES
Daily Progress Note  5/16/2024    Patient Name: Esau Fan    CHIEF COMPLAINT:  Dementia with behavioral disturbance         SUBJECTIVE:      Patient seen face-to-face for follow-up assessment.  He is in the day area.  Pleasant with discussion.  Patient reports mood is good.  However, he worries that he will forget his wife's name if he does not see her soon.  Appears anxious regarding his hospitalization and displays limited insight as to the reason he is admitted.  He denies any suicidal thoughts.  Staff note that patient has been able to maintain behavioral control.  He does require redirection at times, as he forgets which room is his own and wanders.  However, patient has not been agitated or aggressive.  Patient did utilize trazodone overnight to help with sleep.  First time in 2 evenings that he did not require emergency medications.  Noted that he does have a one-to-one sitter with him. Documentation indicates he slept through the night. Patient compliant with scheduled psychotropic medications.  Reviewed for side effects and patient not indicating any concerns. No new labs and vitals are wnl.  Social work has been in contact with patient's wife in regards to disposition planning.  A referral has been sent for home health.    At this time patient is unstable for discharge and is unable to care for himself independently and warrants further hospitalization for safety and stabilization.    Appetite:  [x] Adequate/Unchanged  [] Increased  [] Decreased      Sleep:       [x] Adequate/Unchanged  [] Fair  [] Poor      Group Attendance on Unit:   [] Yes   [] Selectively    [x] No    Compliant with scheduled medications: [x] Yes  [] No    Received emergency medications in past 24 hrs: [] Yes   [x] No    Medication Side Effects: Denies         Mental Status Exam  Level of consciousness: Alert and awake   Appearance: Appropriate attire for setting, standing, with fair  grooming and hygiene   Behavior/Motor:

## 2024-05-16 NOTE — PLAN OF CARE
Problem: Self Harm/Suicidality  Goal: Will have no self-injury during hospital stay  Description: INTERVENTIONS:  1.  Ensure constant observer at bedside with Q15M safety checks  2.  Maintain a safe environment  3.  Secure patient belongings  4.  Ensure family/visitors adhere to safety recommendations  5.  Ensure safety tray has been added to patient's diet order  6.  Every shift and PRN: Re-assess suicidal risk via Frequent Screener    5/15/2024 2018 by Jocelyne Carter LPN  Outcome: Progressing  Note: Patient denies suicidal ideations and thoughts of self-harm. Continuous 1:1  patient monitoring maintained.     Problem: Behavior  Goal: Pt/Family maintain appropriate behavior and adhere to behavioral management agreement, if implemented  Description: INTERVENTIONS:  1. Assess patient/family's coping skills and  non-compliant behavior (including use of illegal substances)  2. Notify security of behavior or suspected illegal substances which indicate the need for search of the family and/or belongings  3. Encourage verbalization of thoughts and concerns in a socially appropriate manner  4. Utilize positive, consistent limit setting strategies supporting safety of patient, staff and others  5. Encourage participation in the decision making process about the behavioral management agreement  6. If a visitor's behavior poses a threat to safety call refer to organization policy.  7. Initiate consult with , Psychosocial CNS, Spiritual Care as appropriate  5/15/2024 2018 by Jocelyne Carter LPN  Outcome: Progressing  Note: Patient maintains controlled behavior this far, needing redirection at times. Patient continues to be only oriented to himself and he is hyper verbal in conversation. Patient understands he's in the hospital but believes he's in [place], [place]. Patient oriented to place, time and situation but he is noted to be confused. Patient is encouraged to verbalize concerns to staff.     Problem:

## 2024-05-16 NOTE — GROUP NOTE
Group Therapy Note    Date: 5/16/2024    Group Start Time: 1100  Group End Time: 1150  Group Topic: Cognitive Skills    Mesha Mcginnis CTRS        Group Therapy Note    Attendees: 4/16     Topic: To increase socialization and practice decision making , and communication skills      Patient did not participate in Cognitive Skills Group at 11:00, despite staff encouragement   and explanation of benefits and modifications to task made available by RT.   Pt was up in day room, talking to students and focused on using phone.     Q15 minute safety checks maintained for patient safety and will continue to encourage   patient to attend unit programming.       Discipline Responsible: Psychoeducational Specialist  Signature:  BOZENA ROBERTO

## 2024-05-16 NOTE — BH NOTE
Patient alert but observed  being anxious mostly due to thinking if wife had left the unit, patient assured his safety and wife hadn't visit the unit today.

## 2024-05-16 NOTE — GROUP NOTE
Group Therapy Note    Date: 5/16/2024    Group Start Time: 1000  Group End Time: 1050  Group Topic: Psychotherapy     Mariana Tate MSW        Group Therapy Note    Attendees: 5/16     Patient was offered group therapy today but declined to participate despite encouragement from staff.  1:1 was offered.    Discipline Responsible: /Counselor      Signature:  LAKE Duque

## 2024-05-16 NOTE — CARE COORDINATION
Writer spoke with Mrs. Fan regarding home care services, she states Mr Fan has not had past experience with in home care, has no preference on agency. Writer contacted [facility], do not cover their area. Writer contacted [facility] located in [place], faxed referral to intake person Constance fax [phone].

## 2024-05-16 NOTE — GROUP NOTE
Group Therapy Note    Date: 5/16/2024    Group Start Time: 1430  Group End Time: 1520  Group Topic: Cognitive Skills    Mesha Mcginnis CTRS        Group Therapy Note    Attendees: 7/15     Topic: To increase socialization and practice self expression, and exploring individual   preferences, positive qualities, connections to peers, and listening/communication skills.        Patient did not participate in Cognitive Skills Group at 14:30, despite staff encouragement   and explanation of benefits and modifications to task made available by RT.   Pt had just talked with  and is focused on using phone.     Q15 minute safety checks maintained for patient safety and will continue to encourage   patient to attend unit programming.       Discipline Responsible: Psychoeducational Specialist  Signature:  BOZENA ROBERTO

## 2024-05-17 PROCEDURE — 6370000000 HC RX 637 (ALT 250 FOR IP): Performed by: NURSE PRACTITIONER

## 2024-05-17 PROCEDURE — 6370000000 HC RX 637 (ALT 250 FOR IP): Performed by: INTERNAL MEDICINE

## 2024-05-17 PROCEDURE — 99232 SBSQ HOSP IP/OBS MODERATE 35: CPT | Performed by: PSYCHIATRY & NEUROLOGY

## 2024-05-17 PROCEDURE — 99232 SBSQ HOSP IP/OBS MODERATE 35: CPT | Performed by: INTERNAL MEDICINE

## 2024-05-17 PROCEDURE — 6370000000 HC RX 637 (ALT 250 FOR IP): Performed by: PSYCHIATRY & NEUROLOGY

## 2024-05-17 PROCEDURE — 90833 PSYTX W PT W E/M 30 MIN: CPT | Performed by: PSYCHIATRY & NEUROLOGY

## 2024-05-17 PROCEDURE — 1240000000 HC EMOTIONAL WELLNESS R&B

## 2024-05-17 PROCEDURE — APPSS30 APP SPLIT SHARED TIME 16-30 MINUTES

## 2024-05-17 PROCEDURE — 6370000000 HC RX 637 (ALT 250 FOR IP)

## 2024-05-17 RX ORDER — RIVASTIGMINE 4.6 MG/24H
1 PATCH, EXTENDED RELEASE TRANSDERMAL DAILY
Qty: 30 PATCH | Refills: 0 | Status: SHIPPED | OUTPATIENT
Start: 2024-05-18

## 2024-05-17 RX ORDER — MEMANTINE HYDROCHLORIDE 5 MG/1
5 TABLET ORAL 2 TIMES DAILY
Qty: 60 TABLET | Refills: 0 | Status: SHIPPED | OUTPATIENT
Start: 2024-05-17

## 2024-05-17 RX ORDER — RISPERIDONE 0.5 MG/1
0.5 TABLET ORAL 2 TIMES DAILY PRN
Qty: 20 TABLET | Refills: 0 | Status: SHIPPED | OUTPATIENT
Start: 2024-05-17

## 2024-05-17 RX ORDER — PANTOPRAZOLE SODIUM 40 MG/1
40 TABLET, DELAYED RELEASE ORAL
Qty: 30 TABLET | Refills: 3 | Status: SHIPPED | OUTPATIENT
Start: 2024-05-18

## 2024-05-17 RX ORDER — HYDRALAZINE HYDROCHLORIDE 25 MG/1
25 TABLET, FILM COATED ORAL EVERY 8 HOURS SCHEDULED
Qty: 90 TABLET | Refills: 0 | Status: SHIPPED | OUTPATIENT
Start: 2024-05-17

## 2024-05-17 RX ORDER — MIRTAZAPINE 7.5 MG/1
7.5 TABLET, FILM COATED ORAL NIGHTLY
Qty: 30 TABLET | Refills: 0 | Status: SHIPPED | OUTPATIENT
Start: 2024-05-17

## 2024-05-17 RX ORDER — TRAZODONE HYDROCHLORIDE 50 MG/1
50 TABLET ORAL NIGHTLY PRN
Qty: 30 TABLET | Refills: 0 | Status: SHIPPED | OUTPATIENT
Start: 2024-05-17

## 2024-05-17 RX ORDER — NICOTINE 21 MG/24HR
1 PATCH, TRANSDERMAL 24 HOURS TRANSDERMAL DAILY
Qty: 30 PATCH | Refills: 3 | Status: SHIPPED | OUTPATIENT
Start: 2024-05-18

## 2024-05-17 RX ORDER — OMEPRAZOLE 20 MG/1
20 CAPSULE, DELAYED RELEASE ORAL 2 TIMES DAILY
Qty: 60 CAPSULE | Refills: 0 | Status: SHIPPED | OUTPATIENT
Start: 2024-05-17

## 2024-05-17 RX ADMIN — MEMANTINE HYDROCHLORIDE 5 MG: 5 TABLET ORAL at 08:22

## 2024-05-17 RX ADMIN — MEMANTINE HYDROCHLORIDE 5 MG: 5 TABLET ORAL at 20:44

## 2024-05-17 RX ADMIN — MIRTAZAPINE 7.5 MG: 15 TABLET, FILM COATED ORAL at 20:44

## 2024-05-17 RX ADMIN — PANTOPRAZOLE SODIUM 40 MG: 40 TABLET, DELAYED RELEASE ORAL at 06:35

## 2024-05-17 RX ADMIN — HYDRALAZINE HYDROCHLORIDE 25 MG: 25 TABLET ORAL at 14:00

## 2024-05-17 RX ADMIN — TRAZODONE HYDROCHLORIDE 50 MG: 50 TABLET ORAL at 20:44

## 2024-05-17 RX ADMIN — HYDRALAZINE HYDROCHLORIDE 25 MG: 25 TABLET ORAL at 06:35

## 2024-05-17 RX ADMIN — HYDRALAZINE HYDROCHLORIDE 25 MG: 25 TABLET ORAL at 21:15

## 2024-05-17 ASSESSMENT — LIFESTYLE VARIABLES
HOW MANY STANDARD DRINKS CONTAINING ALCOHOL DO YOU HAVE ON A TYPICAL DAY: PATIENT DOES NOT DRINK
HOW OFTEN DO YOU HAVE A DRINK CONTAINING ALCOHOL: NEVER

## 2024-05-17 NOTE — PLAN OF CARE
Problem: Behavior  Goal: Pt/Family maintain appropriate behavior and adhere to behavioral management agreement, if implemented  Description: INTERVENTIONS:  1. Assess patient/family's coping skills and  non-compliant behavior (including use of illegal substances)  2. Notify security of behavior or suspected illegal substances which indicate the need for search of the family and/or belongings  3. Encourage verbalization of thoughts and concerns in a socially appropriate manner  4. Utilize positive, consistent limit setting strategies supporting safety of patient, staff and others  5. Encourage participation in the decision making process about the behavioral management agreement  6. If a visitor's behavior poses a threat to safety call refer to organization policy.  7. Initiate consult with , Psychosocial CNS, Spiritual Care as appropriate  5/17/2024 0058 by Mariella Atkinson, RN  Outcome: Progressing    Pt denies thoughts of harm to self or others, safety checks maintained Q15 minutes. Anxious. Pt paces unit and needs some redirection. Oriented to self only. Socializes with peers and staff. Needed some encouragement to take medications. Tangential and presents with delusions. Does not respond to any internal stimuli.     Problem: Sleep Disturbance  Goal: Will exhibit normal sleeping pattern  Description: INTERVENTIONS:  1. Administer medication as ordered  2. Decrease environmental stimuli, including noise, as appropriate  3. Discourage social isolation and naps during the day  5/17/2024 0058 by Mariella Atkinson, RN  Outcome: Progressing    Pt toileted and settled into bed, sleeping well as of this time.     Problem: Self Care Deficit  Goal: Return ADL status to a safe level of function  Description: INTERVENTIONS:  1. Administer medication as ordered  2. Assess ADL deficits and provide assistive devices as needed  3. Obtain PT/OT consults as needed  4. Assist and instruct patient to increase activity and

## 2024-05-17 NOTE — GROUP NOTE
Group Therapy Note    Date: 5/17/2024    Group Start Time: 1430  Group End Time: 1520  Group Topic: Activity    Mesha Mcginnis CTRS        Group Therapy Note    Attendees: 4/12     Topic: To increase socialization and practice self expression, and explore positive coping   skills through art, music, and discussion.      Patient did not participate in Cognitive Skills Group at 14:30, despite staff encouragement   and explanation of benefits and modifications to task made available by RT.   Pt was resting in bed, in room .     Q15 minute safety checks maintained for patient safety and will continue to encourage   patient to attend unit programming.       Discipline Responsible: Psychoeducational Specialist  Signature:  BOZENA ROBERTO

## 2024-05-17 NOTE — GROUP NOTE
Group Therapy Note    Date: 5/17/2024    Group Start Time: 1100  Group End Time: 1145  Group Topic: Cognitive Skills    Mesha Mcginnis CTRS        Group Therapy Note    Attendees: 7/15     Topic: To increase socialization and practice problem solving, collaboration with peers, and   communication skills      Patient did not participate in Cognitive Skills Group at 11:00, despite staff encouragement   and explanation of benefits and modifications to task made available by RT.   Pt was up in day room, politely declined to attend group and is restless, up and down, walking   on unit with 1:1 staff, preoccupied with using the phone.     Q15 minute safety checks maintained for patient safety and will continue to encourage   patient to attend unit programming.       Discipline Responsible: Psychoeducational Specialist  Signature:  BOZENA ROBERTO

## 2024-05-17 NOTE — BH NOTE
LOS:    Patient cooperative,however main focuse is going home and his wife.  Writer informed patient of his wife plans to visit later today. 75% meal intake and fluid intake. 1:1  CONT.

## 2024-05-17 NOTE — PLAN OF CARE
Problem: Self Harm/Suicidality  Goal: Will have no self-injury during hospital stay  Description: INTERVENTIONS:  1.  Ensure constant observer at bedside with Q15M safety checks  2.  Maintain a safe environment  3.  Secure patient belongings  4.  Ensure family/visitors adhere to safety recommendations  5.  Ensure safety tray has been added to patient's diet order  6.  Every shift and PRN: Re-assess suicidal risk via Frequent Screener    5/17/2024 1352 by Melissa Burch LPN  Outcome: Progressing,     Problem: Behavior  Goal: Pt/Family maintain appropriate behavior and adhere to behavioral management agreement, if implemented  Description: INTERVENTIONS:  1. Assess patient/family's coping skills and  non-compliant behavior (including use of illegal substances)  2. Notify security of behavior or suspected illegal substances which indicate the need for search of the family and/or belongings  3. Encourage verbalization of thoughts and concerns in a socially appropriate manner  4. Utilize positive, consistent limit setting strategies supporting safety of patient, staff and others  5. Encourage participation in the decision making process about the behavioral management agreement  6. If a visitor's behavior poses a threat to safety call refer to organization policy.  7. Initiate consult with , Psychosocial CNS, Spiritual Care as appropriate  5/17/2024 1352 by Melissa Burch LPN  Outcome: Progressing     Problem: Safety - Adult  Goal: Free from fall injury, .  Patient Pleasant and cooperative,however gets frustrated due mostly to his forgetfulness, but easily redirected. Patient need much encouragement for toileting and ambulate slowly. Denies self harm. 75% meal and fluid intake. 1:1  continued.

## 2024-05-17 NOTE — PROGRESS NOTES
Baldo PARK DO   tadalafil (CIALIS) 5 MG tablet Take 1 tablet by mouth as needed. 13   Baldo Callejas DO   Testosterone (STRIANT) 30 MG MISC Place 30 mg inside cheek 2 times daily. 13   Baldo Callejas DO        Allergies:     Pcn [penicillins]    Social History:     Tobacco:    reports that he has been smoking cigarettes. He started smoking about 46 years ago. He has a 36.0 pack-year smoking history. He has never used smokeless tobacco.  Alcohol:      reports no history of alcohol use.  Drug Use:  reports no history of drug use.    Family History:     No family history on file.    Review of Systems:     Positive and Negative as described in HPI.    Denies any shortness of breath or cough  Denies chest pain or palpitations  Denies abdominal pain, diarrhea vomiting  Denies any new numbness tremors or weakness.    10 point review of systems was negative other than mentioned above    Physical Exam:     BP (!) 142/89   Pulse (!) 107   Temp 98.8 °F (37.1 °C) (Temporal)   Resp 16   Ht 1.829 m (6' 0.01\")   Wt 93.4 kg (206 lb)   SpO2 100%   BMI 27.93 kg/m²   Temp (24hrs), Av.1 °F (36.7 °C), Min:97.7 °F (36.5 °C), Max:98.8 °F (37.1 °C)    No results for input(s): \"POCGLU\" in the last 72 hours.  No intake or output data in the 24 hours ending 24 1313    General Appearance:  alert, well appearing, and in no acute distress  Head:  normocephalic, atraumatic.  Eye: no icterus, redness, pupils equal and reactive, extraocular eye movements intact, conjunctiva clear  Ear: normal external ear, no discharge, hearing intact  Nose:  no drainage noted  Mouth: mucous membranes moist  Neck: supple, no carotid bruits, thyroid not palpable  Lungs: Bilateral equal air entry, clear to ausculation, no wheezing, rales or rhonchi, normal effort  Cardiovascular: normal rate, regular rhythm, no murmur, gallop, rub.  Abdomen: Soft, nontender, nondistended, normal bowel sounds, no hepatomegaly or splenomegaly  Neurologic:  There are no new focal motor or sensory deficits, normal muscle tone and bulk, no abnormal sensation, normal speech, cranial nerves II through XII grossly intact  Skin: No gross lesions, rashes, bruising or bleeding on exposed skin area  Extremities:  No joint swelling, no pedal edema or calf pain with palpation      Investigations:      Laboratory Testing:  No results found for this or any previous visit (from the past 24 hour(s)).      Imaging/Diagonstics:  Recent data reviewed    Assessment :      Primary Problem  Dementia with behavioral disturbance (HCC)    Active Hospital Problems    Diagnosis Date Noted    Depression with suicidal ideation [F32.A, R45.851] 05/11/2024    Dementia with behavioral disturbance (HCC) [F03.918] 05/11/2024       Plan:     Reason for consult: General medical management     Depression with suicidal ideation-management per psychiatry  Note elevated troponin of 13 upon admission unclear why it was initially done patient history is unreliable due to dementia.  Will repeat stat troponin.  Hypertension blood pressure currently controlled resume home hydralazine  GERD-symptoms currently controlled, resuming home Protonix  Dementia-patient is alert but not oriented.  CBC BMP reviewed and satisfactory, adding liver enzymes and TSH    DVT prophylaxis-not required, patient is mobile    Hypertension, on hydralazine, controlled    Patient, blood pressure is controlled  Has advanced dementia          Consultations:   IP CONSULT TO INTERNAL MEDICINE      Brandon Small MD  5/17/2024  1:13 PM    Copy sent to No primary care provider on file.    Please note that this chart was generated using voice recognition Dragon dictation software.  Although every effort was made to ensure the accuracy of this automated transcription, some errors in transcription may have occurred.

## 2024-05-17 NOTE — CARE COORDINATION
Social Work spoke to patients wife, Marialuisa, regarding discharge plan. Marialuisa confirmed that patient is to go home with her and home healthcare referral, and that she would like an appointment made with his primary care doctor Dr. Joyce.

## 2024-05-17 NOTE — PROGRESS NOTES
CLINICAL PHARMACY NOTE: MEDS TO BEDS    Total # of Prescriptions Filled: 5   The following medications were delivered to the patient:  Trazodone 50mg  Risperidone 0.5mg  Pantoprazole 40mg  Omeprazole 20mg  Mirtazapine 7.5mg    Additional Documentation: 5/17/24 kbg  delivered to [facility] Nurse Tessie aware [id number] complete and below medications not delivered    -Nicotine OTC not cov    Below medications out of stock at [facility] all transferred to [facility] [address] [place] [id number]  [phone]  -Hydralazine  -Namenda  -Rivastigmine 4.6mg patch

## 2024-05-17 NOTE — PROGRESS NOTES
Daily Progress Note  5/17/2024    Patient Name: Esau Fan    CHIEF COMPLAINT:  Dementia with behavioral disturbance         SUBJECTIVE:      Patient seen face-to-face for follow-up assessment while in milieu, declines need for privacy.  On approach patient is alert and oriented to self, place however disorganized to situation and time.  He is pleasant and willing to engage with writer in conversation.  Patient reports being in good mood, denies any depressive or suicidal symptoms.  He is fixated talking about going home with his wife tonight after she visits.  Patient denies any issues with hallucinations, paranoia or homicidal ideation.  Nursing staff reports that patient to experience increased confusion and wandering in the evening otherwise has been calm, in behavioral control and has not required any emergency medication last 24 hours.  Staff reports the patient line of sight during the day and 1:1 evening due to increased confusion and wandering.  He has been compliant with scheduled medication and states to be tolerating well with no side effects.  It appears that patient plan to return home with home health care potentially tomorrow with continued per attending psychiatrist.    Appetite:  [x] Adequate/Unchanged  [] Increased  [] Decreased      Sleep:       [x] Adequate/Unchanged  [] Fair  [] Poor      Group Attendance on Unit:   [] Yes   [] Selectively    [x] No    Compliant with scheduled medications: [x] Yes  [] No    Received emergency medications in past 24 hrs: [] Yes   [x] No    Medication Side Effects: Denies         Mental Status Exam  Level of consciousness: Alert and awake   Appearance: Appropriate attire for setting, seated in chair, with fair  grooming and hygiene   Behavior/Motor: Approachable, distracted  Attitude toward examiner: Attempts to be cooperative, attempts to be attentive, easily distracted, fair eye contact  Speech: Spontaneous, normal volume and tone  Mood: \"Great  Affect:  Aloof  Thought processes: Disorganized, tangential, confabulation  Thought content: Denies HI today;  Denies target, plan or intent.  Suicidal Ideation: Reports improvement in suicidal ideations, contracts for safety on the unit.   Delusions: Patient presents with delusions.  Denies paranoia  Perceptual Disturbance: Patient denies perceptual disturbances.  Does not appear to be responding to internal stimuli.  Cognition: Oriented to self, and location,  Memory: impaired recent, immediate and remote memory  Insight: poor   Judgement: poor     Data   height is 1.829 m (6' 0.01\") and weight is 93.4 kg (206 lb). His temporal temperature is 98.8 °F (37.1 °C). His blood pressure is 142/89 (abnormal) and his pulse is 107 (abnormal). His respiration is 16 and oxygen saturation is 100%.   Labs:   Admission on 05/11/2024   Component Date Value Ref Range Status    Albumin 05/12/2024 3.6  3.5 - 5.2 g/dL Final    Alkaline Phosphatase 05/12/2024 87  40 - 129 U/L Final    ALT 05/12/2024 9  5 - 41 U/L Final    AST 05/12/2024 17  <40 U/L Final    Total Bilirubin 05/12/2024 0.6  0.3 - 1.2 mg/dL Final    Bilirubin, Direct 05/12/2024 0.2  <0.3 mg/dL Final    Bilirubin, Indirect 05/12/2024 0.4  0.0 - 1.0 mg/dL Final    Total Protein 05/12/2024 6.5  6.4 - 8.3 g/dL Final    TSH 05/12/2024 0.82  0.30 - 5.00 uIU/mL Final    Troponin, High Sensitivity 05/11/2024 19  0 - 22 ng/L Final    High Sensitivity Troponin values cannot be compared with other Troponin methodologies.    Hemoglobin A1C 05/12/2024 5.6  4.0 - 6.0 % Final    Estimated Avg Glucose 05/12/2024 114  mg/dL Final    Comment: The ADA and AACC recommend providing the estimated average glucose result to permit better   patient understanding of their HBA1c result.      Cholesterol, Total 05/12/2024 128  <200 mg/dL Final    Comment:    Cholesterol Guidelines:      <200  Desirable   200-240  Borderline      >240  Undesirable         HDL 05/12/2024 41  >40 mg/dL Final    Comment:    HDL  Minoxidil Pregnancy And Lactation Text: This medication has not been assigned a Pregnancy Risk Category but animal studies failed to show danger with the topical medication. It is unknown if the medication is excreted in breast milk.

## 2024-05-18 VITALS
RESPIRATION RATE: 14 BRPM | HEART RATE: 54 BPM | HEIGHT: 72 IN | BODY MASS INDEX: 27.9 KG/M2 | SYSTOLIC BLOOD PRESSURE: 124 MMHG | OXYGEN SATURATION: 100 % | TEMPERATURE: 97.3 F | WEIGHT: 206 LBS | DIASTOLIC BLOOD PRESSURE: 69 MMHG

## 2024-05-18 PROCEDURE — 6370000000 HC RX 637 (ALT 250 FOR IP): Performed by: INTERNAL MEDICINE

## 2024-05-18 PROCEDURE — 99239 HOSP IP/OBS DSCHRG MGMT >30: CPT | Performed by: PSYCHIATRY & NEUROLOGY

## 2024-05-18 PROCEDURE — 6370000000 HC RX 637 (ALT 250 FOR IP): Performed by: PSYCHIATRY & NEUROLOGY

## 2024-05-18 PROCEDURE — 6370000000 HC RX 637 (ALT 250 FOR IP)

## 2024-05-18 PROCEDURE — 6370000000 HC RX 637 (ALT 250 FOR IP): Performed by: NURSE PRACTITIONER

## 2024-05-18 RX ADMIN — PANTOPRAZOLE SODIUM 40 MG: 40 TABLET, DELAYED RELEASE ORAL at 06:25

## 2024-05-18 RX ADMIN — MEMANTINE HYDROCHLORIDE 5 MG: 5 TABLET ORAL at 09:34

## 2024-05-18 RX ADMIN — HYDRALAZINE HYDROCHLORIDE 25 MG: 25 TABLET ORAL at 13:36

## 2024-05-18 RX ADMIN — HYDRALAZINE HYDROCHLORIDE 25 MG: 25 TABLET ORAL at 06:25

## 2024-05-18 NOTE — GROUP NOTE
Group Therapy Note    Date: 5/18/2024    Group Start Time: 0900  Group End Time: 0920  Group Topic: Group Documentation    Patience Michelle, RN        Group Therapy Note    Attendees: 7/14  Community Meeting Group Note        Date: May 18, 2024 Start Time: 9am  End Time:  9:20am       Number of Participants in Group & Unit Census:  7/14    Topic: Goals group    Goal of Group:To establish attainable daily goal(s)      Comments:     Patient did not participate in Community Meeting group, despite staff encouragement and explanation of benefits.  Patient remain seclusive to self.  Q15 minute safety checks maintained for patient safety and will continue to encourage patient to attend unit programming.

## 2024-05-18 NOTE — TRANSITION OF CARE
Behavioral Health Transition Record    Patient Name: Esau Fan  YOB: 1950   Medical Record Number: [id number]  Date of Admission: 5/11/2024 11:10 AM   Date of Discharge: 05/18/24    Attending Provider: Amarjit Hebert MD   Discharging Provider: Amarjit Hebert MD  To contact this individual call [phone] and ask the  to page.  If unavailable, ask to be transferred to Behavioral Health Provider on call.  A Behavioral Health Provider will be available on call 24/7 and during holidays.    Primary Care Provider: No primary care provider on file.    Allergies   Allergen Reactions    Pcn [Penicillins]        Reason for Admission:  Patient presented to [facility] emergency department with altered mental status, alert to person and place. Spouse states he depends on her for all care but did not recognize her last night. Spouse states he got into the car and asked her \"where is Marialuisa?\" (which is her name) and she was sitting right next to him.     Admission Diagnosis: Depression with suicidal ideation [F32.A, R45.851]    * No surgery found *    Results for orders placed or performed during the hospital encounter of 05/11/24   Hepatic Function Panel   Result Value Ref Range    Albumin 3.6 3.5 - 5.2 g/dL    Alkaline Phosphatase 87 40 - 129 U/L    ALT 9 5 - 41 U/L    AST 17 <40 U/L    Total Bilirubin 0.6 0.3 - 1.2 mg/dL    Bilirubin, Direct 0.2 <0.3 mg/dL    Bilirubin, Indirect 0.4 0.0 - 1.0 mg/dL    Total Protein 6.5 6.4 - 8.3 g/dL   TSH with Reflex   Result Value Ref Range    TSH 0.82 0.30 - 5.00 uIU/mL   Troponin   Result Value Ref Range    Troponin, High Sensitivity 19 0 - 22 ng/L   Hemoglobin A1C   Result Value Ref Range    Hemoglobin A1C 5.6 4.0 - 6.0 %    Estimated Avg Glucose 114 mg/dL   Lipid Panel   Result Value Ref Range    Cholesterol, Total 128 <200 mg/dL    HDL 41 >40 mg/dL    LDL Cholesterol 70 0 - 130 mg/dL    Chol/HDL Ratio 3.1 <5    Triglycerides 86 <150 mg/dL

## 2024-05-18 NOTE — PLAN OF CARE
Problem: Self Harm/Suicidality  Goal: Will have no self-injury during hospital stay  Description: INTERVENTIONS:  1.  Ensure constant observer at bedside with Q15M safety checks  2.  Maintain a safe environment  3.  Secure patient belongings  4.  Ensure family/visitors adhere to safety recommendations  5.  Ensure safety tray has been added to patient's diet order  6.  Every shift and PRN: Re-assess suicidal risk via Frequent Screener    5/18/2024 0946 by Destini Newberry LPN  Outcome: Completed     Problem: Behavior  Goal: Pt/Family maintain appropriate behavior and adhere to behavioral management agreement, if implemented  Description: INTERVENTIONS:  1. Assess patient/family's coping skills and  non-compliant behavior (including use of illegal substances)  2. Notify security of behavior or suspected illegal substances which indicate the need for search of the family and/or belongings  3. Encourage verbalization of thoughts and concerns in a socially appropriate manner  4. Utilize positive, consistent limit setting strategies supporting safety of patient, staff and others  5. Encourage participation in the decision making process about the behavioral management agreement  6. If a visitor's behavior poses a threat to safety call refer to organization policy.  7. Initiate consult with , Psychosocial CNS, Spiritual Care as appropriate  5/18/2024 0946 by Destini Newberry LPN  Outcome: Completed     Problem: Sleep Disturbance  Goal: Will exhibit normal sleeping pattern  Description: INTERVENTIONS:  1. Administer medication as ordered  2. Decrease environmental stimuli, including noise, as appropriate  3. Discourage social isolation and naps during the day  Outcome: Completed     Problem: Self Care Deficit  Goal: Return ADL status to a safe level of function  Description: INTERVENTIONS:  1. Administer medication as ordered  2. Assess ADL deficits and provide assistive devices as needed  3. Obtain PT/OT

## 2024-05-18 NOTE — DISCHARGE INSTRUCTIONS
Information:  Medications:   Medication summary provided   I understand that I should take only the medications on my list.     -why and when I need to take each medicine.     -which side effects to watch for.     -that I should carry my medication information at all times in case of     Emergency situations.    I will take all of my medicines to follow up appointments.     -check with my physician or pharmacist before taking any new    Medication, over the counter product or drink alcohol.    -Ask about food, drug or dietary supplement interactions.    -discard old lists and update records with medication providers.    Notify Physician:  Notify physician if you notice:   Always call 911 if you feel your life is in danger  In case of an emergency call 911 immediately!  If 911 is not available call your local emergency medical system for help    Behavioral Health Follow Up:  Original Referral Source:'s   Discharge Diagnosis: Depression with suicidal ideation [F32.A, R45.851]  Recommendations for Level of Care: Attend follow up appointments, take medication as prescribed  Patient status at discharge: Stable  My hospital  was: [facility]  Aftercare plan faxed: Yes   -faxed by: Nurse   -date: 5/18/24    -time: 1800  Prescriptions: Filled via meds to beds and sent home with patient.    Smoking: Quit Smoking.   Call the [employer]'s smoking quitline at 8-256-52M-QUIT  Know the signs of a heart attack   If you have any of the following symptoms call 911 immediately, do not wait more    Than five minutes.    1. Pressure, fullness and/ or squeezing in the center of the chest spreading to    The jaw, neck or shoulder.    2. Chest discomfort with light headedness, fainting, sweating, nausea or    Shortness of breath.   3. Upper abdominal pressure or discomfort.   4. Lower chest pain, back pain, unusual fatigue, shortness of breath, nausea   Or dizziness.     General Information:   Questions regarding your

## 2024-05-18 NOTE — DISCHARGE SUMMARY
symptoms are not manageable.        Medication List        START taking these medications      memantine 5 MG tablet  Commonly known as: NAMENDA  Take 1 tablet by mouth 2 times daily  Notes to patient: memory     mirtazapine 7.5 MG tablet  Commonly known as: REMERON  Take 1 tablet by mouth nightly  Notes to patient: sleep     nicotine 14 MG/24HR  Commonly known as: NICODERM CQ  Place 1 patch onto the skin daily  Notes to patient: Smoking cessation     pantoprazole 40 MG tablet  Commonly known as: PROTONIX  Take 1 tablet by mouth every morning (before breakfast)  Notes to patient: Acid reflux     risperiDONE 0.5 MG tablet  Commonly known as: RISPERDAL  Take 1 tablet by mouth 2 times daily as needed (as needed for agitation)  Notes to patient: mood     rivastigmine 4.6 MG/24HR  Commonly known as: EXELON  Place 1 patch onto the skin daily  Notes to patient: memory     traZODone 50 MG tablet  Commonly known as: DESYREL  Take 1 tablet by mouth nightly as needed for Sleep  Notes to patient: sleep            CHANGE how you take these medications      hydrALAZINE 25 MG tablet  Commonly known as: APRESOLINE  Take 1 tablet by mouth every 8 hours  What changed: See the new instructions.  Notes to patient: Blood pressure     omeprazole 20 MG delayed release capsule  Commonly known as: PriLOSEC  Take 1 capsule by mouth 2 times daily  What changed: Another medication with the same name was removed. Continue taking this medication, and follow the directions you see here.  Notes to patient: Acid reflux            STOP taking these medications      tadalafil 10 MG tablet  Commonly known as: Cialis     tadalafil 5 MG tablet  Commonly known as: Cialis     Testosterone 30 MG Misc  Commonly known as: Striant               Where to Get Your Medications        These medications were sent to [id number] - [place], [address] -  [phone] - F [phone]  [address] [place], [place] [id number]      Phone:  [phone]   hydrALAZINE 25 MG tablet  memantine 5 MG tablet  mirtazapine 7.5 MG tablet  nicotine 14 MG/24HR  omeprazole 20 MG delayed release capsule  pantoprazole 40 MG tablet  risperiDONE 0.5 MG tablet  rivastigmine 4.6 MG/24HR  traZODone 50 MG tablet               Core Measures statement:   Not applicable      TIME SPENT - 35 MINUTES TO COMPLETE THE EVALUATION, DISCHARGE SUMMARY, MEDICATION RECONCILIATION AND FOLLOW UP CARE                                         Esau Fan is a 74 y.o. male being evaluated FACE TO FACE    --NIELS BRYAN MD on 5/18/2024 at 10:11 AM    An electronic signature was used to authenticate this note.     **This report has been created using voice recognition software. It may contain minor errors which are inherent in voice recognition technology.**

## 2024-05-18 NOTE — BH NOTE
Patient given quit line phone number [phone] at this time, refusing to call at this time, states \" I just don't want to quit now\"-  dangers of longterm tobacco use discussed.  staff will continue to reinforce importance of smoking cessation.

## 2024-05-18 NOTE — BH NOTE
[facility]  Discharge Note  Patient discharged home with wife and daughter  Pt discharged with followings belongings:   Dental Appliances: None  Vision - Corrective Lenses: Eyeglasses  Hearing Aid: None  Jewelry: Ring  Body Piercings Removed: N/A  Clothing: Belt, Footwear, Shirt, Pants, Undergarments, Socks  Other Valuables: Cigarettes, Lighter/Matches   Valuables sent home with patient. Patient educated on aftercare instructions: given to patient.  Information faxed to Dr. Jen Jackman by staff  at 2:57 PM .Patient verbalize understanding of AVS:  yes, patient discharged home with wife and daughter.    Status EXAM upon discharge:  Mental Status and Behavioral Exam  Normal: No  Level of Assistance: Independent/Self  Facial Expression: Flat  Affect: Congruent  Level of Consciousness: Confused  Frequency of Checks: 4 times per hour, close  Mood:Normal: No  Mood: Anxious  Motor Activity:Normal: No  Motor Activity: Unusual posture/gait  Eye Contact: Fair  Observed Behavior: Cooperative, Friendly  Sexual Misconduct History: Current - no  Preception: Ada to person  Attention:Normal: No  Attention: Distractible  Thought Processes: Tangential  Thought Content:Normal: No  Thought Content: Preoccupations  Depression Symptoms: Impaired concentration  Anxiety Symptoms: Generalized  Sherly Symptoms: Poor judgment  Hallucinations: None  Delusions: No  Delusions: Obsessions  Memory:Normal: No  Memory: Poor recent  Insight and Judgment: No  Insight and Judgment: Poor judgment    Tobacco Screening:  Practical Counseling, on admission, nya X, if applicable and completed (first 3 are required if patient doesn't refuse):            ( x) Recognizing danger situations (included triggers and roadblocks)                    ( x) Coping skills (new ways to manage stress,relaxation techniques, changing routine, distraction)                                                           ( x) Basic information about quitting

## 2024-05-18 NOTE — PLAN OF CARE
Problem: Self Harm/Suicidality  Goal: Will have no self-injury during hospital stay  Description: INTERVENTIONS:  1.  Ensure constant observer at bedside with Q15M safety checks  2.  Maintain a safe environment  3.  Secure patient belongings  4.  Ensure family/visitors adhere to safety recommendations  5.  Ensure safety tray has been added to patient's diet order  6.  Every shift and PRN: Re-assess suicidal risk via Frequent Screener    5/17/2024 2241 by Baylee Morin LPN  Outcome: Progressing   No sign of self harm noted.  Problem: Behavior  Goal: Pt/Family maintain appropriate behavior and adhere to behavioral management agreement, if implemented  Description: INTERVENTIONS:  1. Assess patient/family's coping skills and  non-compliant behavior (including use of illegal substances)  2. Notify security of behavior or suspected illegal substances which indicate the need for search of the family and/or belongings  3. Encourage verbalization of thoughts and concerns in a socially appropriate manner  4. Utilize positive, consistent limit setting strategies supporting safety of patient, staff and others  5. Encourage participation in the decision making process about the behavioral management agreement  6. If a visitor's behavior poses a threat to safety call refer to organization policy.  7. Initiate consult with , Psychosocial CNS, Spiritual Care as appropriate  5/17/2024 2241 by Baylee Morin LPN  Outcome: Progressing   Patient denies any suicidal ideas at this time. Patient 1:1 staff is continued for safety at this time.  Problem: Self Care Deficit  Goal: Return ADL status to a safe level of function  Description: INTERVENTIONS:  1. Administer medication as ordered  2. Assess ADL deficits and provide assistive devices as needed  3. Obtain PT/OT consults as needed  4. Assist and instruct patient to increase activity and self care as tolerated  5/17/2024 2241 by Baylee Morin LPN  Outcome:  Progressing   Patient needs encouragement to continue to perform ADLs at this time.  Problem: Safety - Adult  Goal: Free from fall injury  Outcome: Progressing   Patient has remained free from falls at this time.

## 2024-08-20 NOTE — PLAN OF CARE
[facility]  Day 3 Interdisciplinary Treatment Plan NOTE    Review Date & Time: 5/13/2024   1245    Admission Type:   Admission Type: Voluntary    Reason for admission:  Reason for Admission: Patient presented to [facility] emergency department with altered mental status, alert to person and place. Spouse states he depends on her for all care but did not recognize her last night. Spouse states he got into the car and asked her \"where is Marialuisa?\" (which is her name) and she was sitting right next to him.  Estimated Length of Stay: 5-7 days  Estimated Discharge Date Update: to be determined by physician    PATIENT STRENGTHS:  Patient Strengths    Patient Strengths and Limitations:Limitations: Unrealistic self-view, Multiple barriers to leisure interests, Difficulty problem solving/relies on others to help solve problems  Addictive Behavior:   Medical Problems:  Past Medical History:   Diagnosis Date    Carpal tunnel syndrome on left     Dyspepsia     and heartburn    GERD (gastroesophageal reflux disease)     Hiatal hernia     HTN (hypertension) 10/03    isolated systolic    Neuroma     right foot    S/P arthroscopy     right thumb    Smoking history 1969 to 2006    2 ppd    Solitary pulmonary nodule     Tinnitus        Risk:  Fall Risk   Michael Scale Michael Scale Score: 22  BVC    Change in scores no Changes to plan of Care no    Status EXAM:   Mental Status and Behavioral Exam  Normal: No  Level of Assistance: Needs encouragement  Facial Expression: Worried  Affect: Blunt  Level of Consciousness: Confused  Frequency of Checks: 1 staff: 1 patient  - continuous  Mood:Normal: No  Mood: Anxious  Motor Activity:Normal: No  Motor Activity: Increased  Eye Contact: Good  Observed Behavior: Cooperative, Friendly, Preoccupied  Sexual Misconduct History: Current - no  Preception: [place] to person  Attention:Normal: No  Attention: Distractible  Thought Processes: Flight of ideas  Thought Content:Normal:  History of present illness:    Past Medical History:   Diagnosis Date    Acute myocardial infarction (Multi) 07/07/2023    Alcohol dependence (Multi) 10/30/2023    Atrial fibrillation (Multi) 10/30/2023    Benign hypertensive heart disease without congestive heart failure 09/04/2023    Congestive heart failure (Multi) 09/30/2023    Coronary arteriosclerosis in native artery 07/07/2023    Coronary artery disease     Essential hypertension 09/30/2023    High blood cholesterol     HTN (hypertension)     Hyperlipidemia 09/04/2023    Impaired perfusion of peripheral tissue 10/30/2023    MI (myocardial infarction) (Multi) 2010    stents placed by Dr. Davila    MI (myocardial infarction) (Multi)     stents x2 by Dr. Salazar    Postsurgical percutaneous transluminal coronary angioplasty status 09/04/2023    Stenosis of coronary artery stent 07/07/2023    Stricture of artery (CMS-HCC) 08/29/2023    Tobacco dependence 09/04/2023    VT (ventricular tachycardia) (Multi) 07/07/2023       Past Surgical History:   Procedure Laterality Date    CORONARY ANGIOPLASTY WITH STENT PLACEMENT      Dr Davila - 2 stents    CORONARY ANGIOPLASTY WITH STENT PLACEMENT      Dr Salazar - 2 stents    CORONARY ARTERY BYPASS GRAFT      MINI cabg x2    ORTHOPEDIC SURGERY      right ankle repair    ORTHOPEDIC SURGERY      right arm repair       No Known Allergies     reports that he quit smoking about 10 months ago. His smoking use included cigarettes. He started smoking about 6 months ago. He has a 17.5 pack-year smoking history. He has been exposed to tobacco smoke. He has never used smokeless tobacco. He reports that he does not currently use alcohol. He reports that he does not use drugs.    Family History   Problem Relation Name Age of Onset    Other (old age) Mother      Other (cabg x3) Father      Prostate cancer Father         Patient's Medications   New Prescriptions    No medications on file   Previous Medications    ASPIRIN 81 MG EC TABLET    TAKE  1 TABLET BY MOUTH DAILY HOLD IF GIVEN IN LAST 24 HOURS OR IF HISTORY OF HYPERSENSITIVITY.    ATORVASTATIN (LIPITOR) 80 MG TABLET    Take 1 tablet (80 mg) by mouth once daily at bedtime.    BRILINTA 90 MG TABLET    Take 1 tablet (90 mg) by mouth 2 times a day.    EZETIMIBE (ZETIA) 10 MG TABLET    Take 1 tablet (10 mg) by mouth once daily.    LOSARTAN (COZAAR) 25 MG TABLET    Take 1 tablet (25 mg) by mouth once daily.    METOPROLOL SUCCINATE XL (TOPROL-XL) 100 MG 24 HR TABLET    Take 1 tablet (100 mg) by mouth once daily.    NITROGLYCERIN (NITROSTAT) 0.4 MG SL TABLET    Take 1 tablet by mouth sublingual as needed for chest pain give up to 3 doses hold of sbp is less than 90. Notify physician.    TRAMADOL (ULTRAM) 50 MG TABLET    Take 1 tablet (50 mg) by mouth every 6 hours if needed.   Modified Medications    No medications on file   Discontinued Medications    No medications on file       Objective   Physical Exam  General: Patient in no acute distress   HEENT: Atraumatic normocephalic.  Neck: Supple, jugular venous pressure within normal limit.  No bruits  Lungs: Clear to auscultation bilaterally  Cardiovascular: Regular rate and rhythm, normal heart sounds, no murmurs rubs or gallops  Abdomen: Soft nontender nondistended.  Normal bowel sounds.  Extremities: Warm to touch, no edema.        Lab Review   Hospital Outpatient Visit on 07/29/2024   Component Date Value    AV pk mono 07/29/2024 1.00     LVOT diam 07/29/2024 2.00     AV mn grad 07/29/2024 2.0     MV E/A ratio 07/29/2024 0.52     Tricuspid annular plane * 07/29/2024 1.4     LV Biplane EF 07/29/2024 34     LA vol index A/L 07/29/2024 18.1     LV EF 07/29/2024 33     RV free wall pk S' 07/29/2024 8.81     LVIDd 07/29/2024 5.07     AV pk grad 07/29/2024 4.0     Aortic Valve Area by Con* 07/29/2024 2.54     Aortic Valve Area by Con* 07/29/2024 2.96     LV A4C EF 07/29/2024 38.1         Assessment/Plan   Patient Active Problem List   Diagnosis    Benign  after discharge goals, continue with medication compliance    SHORT-TERM GOALS UPDATE:   Time frame for Short-Term Goals: 5-7 days    LONG-TERM GOALS UPDATE:   Time frame for Long-Term Goals: 6 months  Members Present in Team Meeting: See Signature Sheet    Kerrie Alves, RN     hypertensive heart disease without congestive heart failure    Arteriosclerotic vascular disease    Hyperlipidemia    Postsurgical percutaneous transluminal coronary angioplasty status    Tobacco dependence    Chest pain    Congestive heart failure (Multi)    Coronary artery disease    Essential hypertension    Hyperlipidemia    CAD (coronary artery disease)    Impaired perfusion of peripheral tissue    Stenosis of coronary stent    Stricture of artery (CMS-HCC)    Injury of back    Ventricular tachycardia, unspecified (Multi)    Acute low back pain    Acute myocardial infarction (Multi)    Alcohol dependence (Multi)    Atrial fibrillation (Multi)    Chronic systolic heart failure (Multi)    Clouded consciousness    Coronary angioplasty status    Fall    Fracture of rib    Tobacco use    Laceration of lower extremity    Abdominal pain    Abnormal x-ray examination      Patient returns to my office follow-up after MIDCAB LIMA to LAD DILLON to circumflex.  Had PCI to RCA in setting of inferior STEMI back in July 7, 2023 and then his MIDCAB was in September 7, 2023.  Postop was complicated by brief episode of atrial fibrillation..  Ejection fraction of 35 to 40%.  Patient returns to my office for follow-up.  Still complaining of shortness of breath with moderate activity.  Complains of left leg moderate to severe claudication.  Also complains of aches and pains of his shoulders back lower back.  Denies having orthopnea PND or lower extremity edema.  Repeated 2D echo showed ejection fraction of 30-35%.  Monitor for 2 weeks showed no evidence of atrial fibrillation..  At this point my recommendation is to increase metoprolol to 200 mg oral daily.  We will also increase losartan to 50 mg oral daily.  We will repeat MUGA scan in 4 weeks to see what is his ejection fraction on optimal medical therapy and need for ICD.  Will refer to Dr. Aguilar for lower extremity claudication especially on the left side.  We will follow-up in  3 months.    Silvio Salazar MD

## 2024-08-21 ENCOUNTER — APPOINTMENT (OUTPATIENT)
Dept: INTERVENTIONAL RADIOLOGY/VASCULAR | Age: 74
End: 2024-08-21
Payer: MEDICARE

## 2024-08-21 ENCOUNTER — APPOINTMENT (OUTPATIENT)
Dept: GENERAL RADIOLOGY | Age: 74
End: 2024-08-21
Payer: MEDICARE

## 2024-08-21 ENCOUNTER — HOSPITAL ENCOUNTER (EMERGENCY)
Age: 74
Discharge: HOME | End: 2024-08-21
Attending: EMERGENCY MEDICINE
Payer: MEDICARE

## 2024-08-21 VITALS
TEMPERATURE: 98 F | BODY MASS INDEX: 27.93 KG/M2 | WEIGHT: 206 LBS | RESPIRATION RATE: 18 BRPM | HEART RATE: 62 BPM | OXYGEN SATURATION: 97 % | DIASTOLIC BLOOD PRESSURE: 73 MMHG | SYSTOLIC BLOOD PRESSURE: 158 MMHG

## 2024-08-21 DIAGNOSIS — R53.1 GENERAL WEAKNESS: ICD-10-CM

## 2024-08-21 DIAGNOSIS — R53.83 FATIGUE, UNSPECIFIED TYPE: Primary | ICD-10-CM

## 2024-08-21 LAB
ALBUMIN SERPL BCG-MCNC: 3.5 G/DL (ref 3.5–5.1)
ALP SERPL-CCNC: 86 U/L (ref 38–126)
ALT SERPL W/O P-5'-P-CCNC: 8 U/L (ref 11–66)
ANION GAP SERPL CALC-SCNC: 13 MEQ/L (ref 8–16)
AST SERPL-CCNC: 10 U/L (ref 5–40)
BASOPHILS ABSOLUTE: 0.1 THOU/MM3 (ref 0–0.1)
BASOPHILS NFR BLD AUTO: 0.7 %
BILIRUB SERPL-MCNC: 0.2 MG/DL (ref 0.3–1.2)
BUN SERPL-MCNC: 13 MG/DL (ref 7–22)
CALCIUM SERPL-MCNC: 8.5 MG/DL (ref 8.5–10.5)
CHLORIDE SERPL-SCNC: 94 MEQ/L (ref 98–111)
CO2 SERPL-SCNC: 22 MEQ/L (ref 23–33)
CREAT SERPL-MCNC: 0.8 MG/DL (ref 0.4–1.2)
DEPRECATED RDW RBC AUTO: 45.9 FL (ref 35–45)
EKG ATRIAL RATE: 60 BPM
EKG P AXIS: 95 DEGREES
EKG P-R INTERVAL: 172 MS
EKG Q-T INTERVAL: 418 MS
EKG QRS DURATION: 94 MS
EKG QTC CALCULATION (BAZETT): 418 MS
EKG R AXIS: -13 DEGREES
EKG T AXIS: 57 DEGREES
EKG VENTRICULAR RATE: 60 BPM
EOSINOPHIL NFR BLD AUTO: 11.9 %
EOSINOPHILS ABSOLUTE: 0.9 THOU/MM3 (ref 0–0.4)
ERYTHROCYTE [DISTWIDTH] IN BLOOD BY AUTOMATED COUNT: 13.5 % (ref 11.5–14.5)
GFR SERPL CREATININE-BSD FRML MDRD: > 90 ML/MIN/1.73M2
GLUCOSE SERPL-MCNC: 130 MG/DL (ref 70–108)
HCT VFR BLD AUTO: 35.8 % (ref 42–52)
HGB BLD-MCNC: 12.3 GM/DL (ref 14–18)
IMM GRANULOCYTES # BLD AUTO: 0.03 THOU/MM3 (ref 0–0.07)
IMM GRANULOCYTES NFR BLD AUTO: 0.4 %
LACTIC ACID, SEPSIS: 1.3 MMOL/L (ref 0.5–1.9)
LYMPHOCYTES ABSOLUTE: 2.3 THOU/MM3 (ref 1–4.8)
LYMPHOCYTES NFR BLD AUTO: 31.1 %
MCH RBC QN AUTO: 32.1 PG (ref 26–33)
MCHC RBC AUTO-ENTMCNC: 34.4 GM/DL (ref 32.2–35.5)
MCV RBC AUTO: 93.5 FL (ref 80–94)
MONOCYTES ABSOLUTE: 0.5 THOU/MM3 (ref 0.4–1.3)
MONOCYTES NFR BLD AUTO: 7 %
NEUTROPHILS ABSOLUTE: 3.7 THOU/MM3 (ref 1.8–7.7)
NEUTROPHILS NFR BLD AUTO: 48.9 %
NRBC BLD AUTO-RTO: 0 /100 WBC
NT-PROBNP SERPL IA-MCNC: 233.1 PG/ML (ref 0–449)
OSMOLALITY SERPL CALC.SUM OF ELEC: 260.8 MOSMOL/KG (ref 275–300)
PLATELET # BLD AUTO: 292 THOU/MM3 (ref 130–400)
PMV BLD AUTO: 9.7 FL (ref 9.4–12.4)
POTASSIUM SERPL-SCNC: 4.4 MEQ/L (ref 3.5–5.2)
PROT SERPL-MCNC: 6 G/DL (ref 6.1–8)
RBC # BLD AUTO: 3.83 MILL/MM3 (ref 4.7–6.1)
SODIUM SERPL-SCNC: 129 MEQ/L (ref 135–145)
TROPONIN, HIGH SENSITIVITY: 11 NG/L (ref 0–12)
WBC # BLD AUTO: 7.5 THOU/MM3 (ref 4.8–10.8)

## 2024-08-21 PROCEDURE — 93010 ELECTROCARDIOGRAM REPORT: CPT | Performed by: INTERNAL MEDICINE

## 2024-08-21 PROCEDURE — 36415 COLL VENOUS BLD VENIPUNCTURE: CPT

## 2024-08-21 PROCEDURE — 93971 EXTREMITY STUDY: CPT

## 2024-08-21 PROCEDURE — 99285 EMERGENCY DEPT VISIT HI MDM: CPT

## 2024-08-21 PROCEDURE — 83880 ASSAY OF NATRIURETIC PEPTIDE: CPT

## 2024-08-21 PROCEDURE — 83605 ASSAY OF LACTIC ACID: CPT

## 2024-08-21 PROCEDURE — 84484 ASSAY OF TROPONIN QUANT: CPT

## 2024-08-21 PROCEDURE — 80053 COMPREHEN METABOLIC PANEL: CPT

## 2024-08-21 PROCEDURE — 93005 ELECTROCARDIOGRAM TRACING: CPT

## 2024-08-21 PROCEDURE — 71046 X-RAY EXAM CHEST 2 VIEWS: CPT

## 2024-08-21 PROCEDURE — 85025 COMPLETE CBC W/AUTO DIFF WBC: CPT

## 2024-08-21 ASSESSMENT — PAIN - FUNCTIONAL ASSESSMENT
PAIN_FUNCTIONAL_ASSESSMENT: NONE - DENIES PAIN
PAIN_FUNCTIONAL_ASSESSMENT: NONE - DENIES PAIN

## 2024-08-21 NOTE — ED PROVIDER NOTES
following up with PCP.    Results discussed with patient.   Patient verbalized understanding and agreement to plan.  Refer to ED course for additional information.      ED COURSE   ED Medications administered this visit (None if left blank):   Medications - No data to display    ED Course as of 08/21/24 1750   Wed Aug 21, 2024   1619 XR CHEST (2 VW)  No acute intrathoracic process. [CA]   1723 Vascular duplex lower extremity venous left  no evidence of a DVT in the left lower extremity [CA]      ED Course User Index  [CA] Ray Bazan MD       PROCEDURES: (None if blank)  Procedures:     CRITICAL CARE:  None    MEDICATION CHANGES     New Prescriptions    No medications on file       FINAL DISPOSITION   Shared Decision-Making was performed, disposition discussed with the patient/family and questions answered.    Outpatient follow up (If applicable):  Jhonny Sharif MD  [address] [address]    Call in 1 day        FINAL DIAGNOSES:  Final diagnoses:   Fatigue, unspecified type   General weakness       Condition: condition: stable  Dispo: Discharge to home  DISPOSITION Decision To Discharge 08/21/2024 05:34:41 PM  Condition at Disposition: Stable      This transcription was electronically signed. It was dictated by use of voice recognition software and electronically transcribed. The transcription may contain errors not detected in proofreading.

## 2024-08-21 NOTE — ED NOTES
Patient to the ED with complaint of weakness. Patient was brought to the ED after family called out for EMS due to patient having generalized weakness and intermittent confusion. Patient is alert to situation and place but not to time. Patient requests to see his wife who is apparently , and states that Tony NEWMANAbby Jonatan is the current . Patient is pleasant and able to discuss previous career of being an . Patient denies any physical complaints. Patient is resting in bed with easy and unlabored respirations. Call light in reach. Side rails up x2. Patient denies further complaints or concerns. Will monitor.

## 2024-08-21 NOTE — ED NOTES
Patient's wife notes patient provided urine sample and requests urinalysis. Wife requests that if result is positive for UTI to call them with prescription as she and patient would like to be discharged at this time. Attending notified.

## 2024-08-21 NOTE — DISCHARGE INSTRUCTIONS
Come back to the ED:  If you experience any blood pressure less than 90/50 mmHg with concerning symptoms such as sweating, chest pain or shortness of breath.

## 2024-11-26 ENCOUNTER — APPOINTMENT (OUTPATIENT)
Dept: GENERAL RADIOLOGY | Age: 74
DRG: 603 | End: 2024-11-26
Payer: MEDICARE

## 2024-11-26 ENCOUNTER — HOSPITAL ENCOUNTER (INPATIENT)
Age: 74
LOS: 7 days | Discharge: HOME | DRG: 603 | End: 2024-12-03
Attending: EMERGENCY MEDICINE | Admitting: PHYSICIAN ASSISTANT
Payer: MEDICARE

## 2024-11-26 ENCOUNTER — APPOINTMENT (OUTPATIENT)
Dept: INTERVENTIONAL RADIOLOGY/VASCULAR | Age: 74
DRG: 603 | End: 2024-11-26
Payer: MEDICARE

## 2024-11-26 DIAGNOSIS — L97.821: ICD-10-CM

## 2024-11-26 DIAGNOSIS — E87.1 HYPONATREMIA: ICD-10-CM

## 2024-11-26 DIAGNOSIS — E43 SEVERE MALNUTRITION (HCC): ICD-10-CM

## 2024-11-26 DIAGNOSIS — R06.02 SHORTNESS OF BREATH: ICD-10-CM

## 2024-11-26 DIAGNOSIS — L97.829: ICD-10-CM

## 2024-11-26 DIAGNOSIS — M79.89 SWELLING OF LEFT LOWER EXTREMITY: Primary | ICD-10-CM

## 2024-11-26 PROBLEM — L03.90 CELLULITIS: Status: ACTIVE | Noted: 2024-11-26

## 2024-11-26 LAB
ANION GAP SERPL CALC-SCNC: 10 MEQ/L (ref 8–16)
BASOPHILS ABSOLUTE: 0 THOU/MM3 (ref 0–0.1)
BASOPHILS NFR BLD AUTO: 0.5 %
BUN SERPL-MCNC: 14 MG/DL (ref 7–22)
BURR CELLS BLD QL SMEAR: ABNORMAL
CALCIUM SERPL-MCNC: 9.3 MG/DL (ref 8.5–10.5)
CHLORIDE SERPL-SCNC: 90 MEQ/L (ref 98–111)
CO2 SERPL-SCNC: 23 MEQ/L (ref 23–33)
CREAT SERPL-MCNC: 0.9 MG/DL (ref 0.4–1.2)
DEPRECATED RDW RBC AUTO: 46.2 FL (ref 35–45)
EKG ATRIAL RATE: 62 BPM
EKG P AXIS: 84 DEGREES
EKG P-R INTERVAL: 182 MS
EKG Q-T INTERVAL: 392 MS
EKG QRS DURATION: 88 MS
EKG QTC CALCULATION (BAZETT): 397 MS
EKG R AXIS: -30 DEGREES
EKG T AXIS: 2 DEGREES
EKG VENTRICULAR RATE: 62 BPM
EOSINOPHIL NFR BLD AUTO: 3.3 %
EOSINOPHILS ABSOLUTE: 0.2 THOU/MM3 (ref 0–0.4)
ERYTHROCYTE [DISTWIDTH] IN BLOOD BY AUTOMATED COUNT: 13.4 % (ref 11.5–14.5)
GFR SERPL CREATININE-BSD FRML MDRD: 88 ML/MIN/1.73M2
GLUCOSE SERPL-MCNC: 138 MG/DL (ref 70–108)
HCT VFR BLD AUTO: 40.4 % (ref 42–52)
HGB BLD-MCNC: 13.9 GM/DL (ref 14–18)
IMM GRANULOCYTES # BLD AUTO: 0.03 THOU/MM3 (ref 0–0.07)
IMM GRANULOCYTES NFR BLD AUTO: 0.5 %
LYMPHOCYTES ABSOLUTE: 1 THOU/MM3 (ref 1–4.8)
LYMPHOCYTES NFR BLD AUTO: 17.2 %
MCH RBC QN AUTO: 32.4 PG (ref 26–33)
MCHC RBC AUTO-ENTMCNC: 34.4 GM/DL (ref 32.2–35.5)
MCV RBC AUTO: 94.2 FL (ref 80–94)
MONOCYTES ABSOLUTE: 0.4 THOU/MM3 (ref 0.4–1.3)
MONOCYTES NFR BLD AUTO: 6.4 %
NEUTROPHILS ABSOLUTE: 4.4 THOU/MM3 (ref 1.8–7.7)
NEUTROPHILS NFR BLD AUTO: 72.1 %
NRBC BLD AUTO-RTO: 0 /100 WBC
NT-PROBNP SERPL IA-MCNC: 283 PG/ML (ref 0–449)
OSMOLALITY SERPL CALC.SUM OF ELEC: 250.4 MOSMOL/KG (ref 275–300)
OSMOLALITY UR: NORMAL MOSMOL/KG (ref 250–750)
PLATELET # BLD AUTO: 58 THOU/MM3 (ref 130–400)
PLATELET BLD QL SMEAR: ABNORMAL
PMV BLD AUTO: 10.6 FL (ref 9.4–12.4)
POTASSIUM SERPL-SCNC: 4.7 MEQ/L (ref 3.5–5.2)
POTASSIUM SERPL-SCNC: 5.3 MEQ/L (ref 3.5–5.2)
RBC # BLD AUTO: 4.29 MILL/MM3 (ref 4.7–6.1)
SCAN OF BLOOD SMEAR: NORMAL
SODIUM SERPL-SCNC: 123 MEQ/L (ref 135–145)
SODIUM SERPL-SCNC: 131 MEQ/L (ref 135–145)
SODIUM UR-SCNC: 93 MEQ/L
TROPONIN, HIGH SENSITIVITY: 20 NG/L (ref 0–12)
TROPONIN, HIGH SENSITIVITY: 21 NG/L (ref 0–12)
TSH SERPL DL<=0.005 MIU/L-ACNC: 1.91 UIU/ML (ref 0.4–4.2)
WBC # BLD AUTO: 6.1 THOU/MM3 (ref 4.8–10.8)

## 2024-11-26 PROCEDURE — 36415 COLL VENOUS BLD VENIPUNCTURE: CPT

## 2024-11-26 PROCEDURE — 84295 ASSAY OF SERUM SODIUM: CPT

## 2024-11-26 PROCEDURE — 83935 ASSAY OF URINE OSMOLALITY: CPT

## 2024-11-26 PROCEDURE — 80048 BASIC METABOLIC PNL TOTAL CA: CPT

## 2024-11-26 PROCEDURE — 99285 EMERGENCY DEPT VISIT HI MDM: CPT

## 2024-11-26 PROCEDURE — 99214 OFFICE O/P EST MOD 30 MIN: CPT | Performed by: NURSE PRACTITIONER

## 2024-11-26 PROCEDURE — 93971 EXTREMITY STUDY: CPT

## 2024-11-26 PROCEDURE — 93005 ELECTROCARDIOGRAM TRACING: CPT | Performed by: EMERGENCY MEDICINE

## 2024-11-26 PROCEDURE — 99223 1ST HOSP IP/OBS HIGH 75: CPT | Performed by: PHYSICIAN ASSISTANT

## 2024-11-26 PROCEDURE — 84443 ASSAY THYROID STIM HORMONE: CPT

## 2024-11-26 PROCEDURE — 99215 OFFICE O/P EST HI 40 MIN: CPT

## 2024-11-26 PROCEDURE — 84300 ASSAY OF URINE SODIUM: CPT

## 2024-11-26 PROCEDURE — 83880 ASSAY OF NATRIURETIC PEPTIDE: CPT

## 2024-11-26 PROCEDURE — 6360000002 HC RX W HCPCS: Performed by: PHYSICIAN ASSISTANT

## 2024-11-26 PROCEDURE — 6370000000 HC RX 637 (ALT 250 FOR IP): Performed by: PHYSICIAN ASSISTANT

## 2024-11-26 PROCEDURE — 1200000000 HC SEMI PRIVATE

## 2024-11-26 PROCEDURE — 73590 X-RAY EXAM OF LOWER LEG: CPT

## 2024-11-26 PROCEDURE — 93010 ELECTROCARDIOGRAM REPORT: CPT | Performed by: INTERNAL MEDICINE

## 2024-11-26 PROCEDURE — 84132 ASSAY OF SERUM POTASSIUM: CPT

## 2024-11-26 PROCEDURE — 85025 COMPLETE CBC W/AUTO DIFF WBC: CPT

## 2024-11-26 PROCEDURE — 84484 ASSAY OF TROPONIN QUANT: CPT

## 2024-11-26 PROCEDURE — 73600 X-RAY EXAM OF ANKLE: CPT

## 2024-11-26 PROCEDURE — 1200000003 HC TELEMETRY R&B

## 2024-11-26 RX ORDER — PANTOPRAZOLE SODIUM 40 MG/1
40 TABLET, DELAYED RELEASE ORAL
Status: DISCONTINUED | OUTPATIENT
Start: 2024-11-27 | End: 2024-12-03 | Stop reason: HOSPADM

## 2024-11-26 RX ORDER — POTASSIUM CHLORIDE 7.45 MG/ML
10 INJECTION INTRAVENOUS PRN
Status: DISCONTINUED | OUTPATIENT
Start: 2024-11-26 | End: 2024-11-26 | Stop reason: RX

## 2024-11-26 RX ORDER — SODIUM CHLORIDE 0.9 % (FLUSH) 0.9 %
5-40 SYRINGE (ML) INJECTION PRN
Status: DISCONTINUED | OUTPATIENT
Start: 2024-11-26 | End: 2024-12-03 | Stop reason: HOSPADM

## 2024-11-26 RX ORDER — SODIUM CHLORIDE 9 MG/ML
INJECTION, SOLUTION INTRAVENOUS PRN
Status: DISCONTINUED | OUTPATIENT
Start: 2024-11-26 | End: 2024-12-03 | Stop reason: HOSPADM

## 2024-11-26 RX ORDER — RIVASTIGMINE 4.6 MG/24H
1 PATCH, EXTENDED RELEASE TRANSDERMAL DAILY
Status: DISCONTINUED | OUTPATIENT
Start: 2024-11-27 | End: 2024-12-03 | Stop reason: HOSPADM

## 2024-11-26 RX ORDER — MAGNESIUM SULFATE IN WATER 40 MG/ML
2000 INJECTION, SOLUTION INTRAVENOUS PRN
Status: DISCONTINUED | OUTPATIENT
Start: 2024-11-26 | End: 2024-12-03 | Stop reason: HOSPADM

## 2024-11-26 RX ORDER — POTASSIUM CHLORIDE 1500 MG/1
40 TABLET, EXTENDED RELEASE ORAL PRN
Status: DISCONTINUED | OUTPATIENT
Start: 2024-11-26 | End: 2024-12-03 | Stop reason: HOSPADM

## 2024-11-26 RX ORDER — MEMANTINE HYDROCHLORIDE 5 MG/1
5 TABLET ORAL 2 TIMES DAILY
Status: DISCONTINUED | OUTPATIENT
Start: 2024-11-26 | End: 2024-12-03 | Stop reason: HOSPADM

## 2024-11-26 RX ORDER — MIRTAZAPINE 7.5 MG/1
7.5 TABLET, FILM COATED ORAL NIGHTLY
Status: DISCONTINUED | OUTPATIENT
Start: 2024-11-26 | End: 2024-12-03 | Stop reason: HOSPADM

## 2024-11-26 RX ORDER — ONDANSETRON 2 MG/ML
4 INJECTION INTRAMUSCULAR; INTRAVENOUS EVERY 6 HOURS PRN
Status: DISCONTINUED | OUTPATIENT
Start: 2024-11-26 | End: 2024-12-03 | Stop reason: HOSPADM

## 2024-11-26 RX ORDER — ENOXAPARIN SODIUM 100 MG/ML
40 INJECTION SUBCUTANEOUS EVERY 24 HOURS
Status: DISCONTINUED | OUTPATIENT
Start: 2024-11-26 | End: 2024-12-03 | Stop reason: HOSPADM

## 2024-11-26 RX ORDER — HYDRALAZINE HYDROCHLORIDE 25 MG/1
12.5 TABLET, FILM COATED ORAL DAILY
Status: DISCONTINUED | OUTPATIENT
Start: 2024-11-27 | End: 2024-12-03 | Stop reason: HOSPADM

## 2024-11-26 RX ORDER — ACETAMINOPHEN 325 MG/1
650 TABLET ORAL EVERY 6 HOURS PRN
Status: DISCONTINUED | OUTPATIENT
Start: 2024-11-26 | End: 2024-12-03 | Stop reason: HOSPADM

## 2024-11-26 RX ORDER — SODIUM CHLORIDE 0.9 % (FLUSH) 0.9 %
5-40 SYRINGE (ML) INJECTION EVERY 12 HOURS SCHEDULED
Status: DISCONTINUED | OUTPATIENT
Start: 2024-11-26 | End: 2024-12-03 | Stop reason: HOSPADM

## 2024-11-26 RX ORDER — DOXYCYCLINE HYCLATE 100 MG
100 TABLET ORAL EVERY 12 HOURS SCHEDULED
Status: DISCONTINUED | OUTPATIENT
Start: 2024-11-26 | End: 2024-12-03 | Stop reason: HOSPADM

## 2024-11-26 RX ORDER — RISPERIDONE 0.25 MG/1
0.5 TABLET ORAL 2 TIMES DAILY PRN
Status: DISCONTINUED | OUTPATIENT
Start: 2024-11-26 | End: 2024-12-03 | Stop reason: HOSPADM

## 2024-11-26 RX ORDER — ACETAMINOPHEN 650 MG/1
650 SUPPOSITORY RECTAL EVERY 6 HOURS PRN
Status: DISCONTINUED | OUTPATIENT
Start: 2024-11-26 | End: 2024-12-03 | Stop reason: HOSPADM

## 2024-11-26 RX ORDER — POLYETHYLENE GLYCOL 3350 17 G/17G
17 POWDER, FOR SOLUTION ORAL DAILY PRN
Status: DISCONTINUED | OUTPATIENT
Start: 2024-11-26 | End: 2024-12-03 | Stop reason: HOSPADM

## 2024-11-26 RX ORDER — TRAZODONE HYDROCHLORIDE 50 MG/1
50 TABLET, FILM COATED ORAL NIGHTLY PRN
Status: DISCONTINUED | OUTPATIENT
Start: 2024-11-26 | End: 2024-12-03 | Stop reason: HOSPADM

## 2024-11-26 RX ORDER — ONDANSETRON 4 MG/1
4 TABLET, ORALLY DISINTEGRATING ORAL EVERY 8 HOURS PRN
Status: DISCONTINUED | OUTPATIENT
Start: 2024-11-26 | End: 2024-12-03 | Stop reason: HOSPADM

## 2024-11-26 RX ADMIN — DOXYCYCLINE HYCLATE 100 MG: 100 TABLET, COATED ORAL at 21:52

## 2024-11-26 RX ADMIN — ENOXAPARIN SODIUM 40 MG: 100 INJECTION SUBCUTANEOUS at 21:53

## 2024-11-26 RX ADMIN — TRAZODONE HYDROCHLORIDE 50 MG: 50 TABLET ORAL at 21:52

## 2024-11-26 RX ADMIN — MEMANTINE 5 MG: 5 TABLET ORAL at 21:52

## 2024-11-26 RX ADMIN — RISPERIDONE 0.5 MG: 0.25 TABLET, FILM COATED ORAL at 21:52

## 2024-11-26 RX ADMIN — MIRTAZAPINE 7.5 MG: 7.5 TABLET, FILM COATED ORAL at 21:53

## 2024-11-26 ASSESSMENT — PAIN - FUNCTIONAL ASSESSMENT
PAIN_FUNCTIONAL_ASSESSMENT: NONE - DENIES PAIN
PAIN_FUNCTIONAL_ASSESSMENT: NONE - DENIES PAIN

## 2024-11-26 NOTE — ED TRIAGE NOTES
To room 2 per wheelchair c/o left leg redness swelling and drainage. WIfe states she spoke with Dr Jackman office and the recommend urgent care and a wound culture. Wife states \"I caught our dog licking it\"  she also states he is under treatment rotating months with bactrim and duricef monthly rotation for oesteomylitis right leg

## 2024-11-26 NOTE — ED PROVIDER NOTES
[facility] EMERGENCY DEPT  EMERGENCY DEPARTMENT ENCOUNTER          Pt Name: Esau Fan  MRN: [id number]  Birthdate 1950  Date of evaluation: 11/26/2024  Physician: Whitney Zurita MD  Supervising Attending Physician: Ry Nino DO       CHIEF COMPLAINT       Chief Complaint   Patient presents with    Leg Swelling     \" A long time have checked for DVT hospitalized in May for . Has oesteomylitis right leg under care Dr Jackman\" x 6 months.  Redness and draining clear liquid started week ago Dr Jackman  wants a culture          HISTORY OF PRESENT ILLNESS    HPI  Esau Fan is a 74 y.o. male medical history of hypertension, GERD, dementia, osteomyelitis, solitary pulmonary nodules, depression with suicidal ideation, who presents to the emergency department from home for evaluation of leg swelling.  Patient wife reports that left leg redness, swelling, and drainage for couple of days now, the patient wife stated the swelling started about 6 months ago, and they spoke to Dr. Jackman office and the recommended urgent care and wound culture.  The wife stated \"I caught our dog licking it \" which mean the left leg swelling.  Patient wife stated that he is under treatment rotating months with Bactrim and Duricef monthly rotations for osteomyelitis right leg which does not happened 2012.  The patient wife also reported he have some trouble swallowing, cough, some of aspiration as she stated.  The patient wife denies fever, chills, headache, lightheadedness, dizziness, vision changes, tinnitus, cough, congestion, sore throat, neck pain/stiffness, chest pain, shortness of breath, abdominal pain, nausea, vomiting, constipation, diarrhea, dysuria, blood in the urine or stool, numbness/tingling/weakness in extremities.    The patient has no other acute complaints at this time.      PAST MEDICAL AND SURGICAL HISTORY     Past Medical History:   Diagnosis Date    Carpal tunnel syndrome on left      nightly    NICOTINE (NICODERM CQ) 14 MG/24HR    Place 1 patch onto the skin daily    OMEPRAZOLE (PRILOSEC) 20 MG DELAYED RELEASE CAPSULE    Take 1 capsule by mouth 2 times daily    PANTOPRAZOLE (PROTONIX) 40 MG TABLET    Take 1 tablet by mouth every morning (before breakfast)    RISPERIDONE (RISPERDAL) 0.5 MG TABLET    Take 1 tablet by mouth 2 times daily as needed (as needed for agitation)    RIVASTIGMINE (EXELON) 4.6 MG/24HR    Place 1 patch onto the skin daily    SULFAMETHOXAZOLE-TRIMETHOPRIM (BACTRIM DS PO)    Take by mouth X 1 month  started (on sice 2012)    TRAZODONE (DESYREL) 50 MG TABLET    Take 1 tablet by mouth nightly as needed for Sleep         SOCIAL HISTORY     Social History     Social History Narrative    Not on file     Social History     Tobacco Use    Smoking status: Some Days     Current packs/day: 0.50     Average packs/day: 1.5 packs/day for 24.6 years (36.3 ttl pk-yrs)     Types: Cigarettes     Start date: 1/1/1978     Last attempt to quit: 1/1/2002    Smokeless tobacco: Never   Substance Use Topics    Alcohol use: No    Drug use: No         ALLERGIES     Allergies   Allergen Reactions    Pcn [Penicillins]       Dont know reaction\"    Vancomycin Other (See Comments)     \" Arm felt like it was on fire\"         FAMILY HISTORY   History reviewed. No pertinent family history.      PHYSICAL EXAM     ED Triage Vitals [11/26/24 1404]   BP Systolic BP Percentile Diastolic BP Percentile Temp Temp src Pulse Respirations SpO2   (!) 151/73 -- -- 96.8 °F (36 °C) -- (!) 109 14 95 %      Height Weight - Scale         1.829 m (6') 71.2 kg (157 lb)           Initial vital signs and nursing assessment reviewed. Body mass index is 21.29 kg/m².     Additional Vital Signs:  Vitals:    11/26/24 1535   BP: (!) 147/60   Pulse: 64   Resp: 17   Temp:    SpO2: 99%       Physical Exam  Vitals and nursing note reviewed.   Constitutional:       General: He is not in acute distress.     Appearance: Normal appearance. He is  document has been electronically signed by: Mary Friend MD    on 11/26/2024 05:24 PM      XR ANKLE LEFT (2 VIEWS)   Final Result   Impression:   No acute fracture.      This document has been electronically signed by: Mary Friend MD    on 11/26/2024 05:23 PM        See ED course below for my interpretation if applicable.  All radiology images independently reviewed by me in the clinical context of this patient, in addition to interpretation provided by the radiologist.      EKG interpretation:  See ED course (if applicable) [none if blank]  All EKG results are individually reviewed and interpreted by me in the clinical context of this patient.  All EKGs are also interpreted by our Cardiology department, final interpretation may not be available as of the writing of this note.      PREVIOUS RECORDS  AND EXTERNAL INFORMATION REVIEWED   History obtained from: the patient.  Pertinent previous and/or external records reviewed: Noncontributory.  Case discussed with specialties other than Emergency Medicine: Not Applicable      MEDICAL DECISION MAKING   Differential Diagnosis includes but is not limited to:  COPD, CKD, congestive heart failure, overloaded fluid, cellulitis, DVT, osteomyelitis, vascular ulcer, peripheral vascular disease, dermatitis,    Decision Rules/Clinical Scores utilized:    Not Applicable    MIPS documentation:  N/A    Medical Decision Making  74 years old male presented to the emergency department complaining of left lower leg swelling.  I evaluate the patient in the emergency department I ordered a workup including CBC, BMP, troponin, BNP, EKG, x-ray lower extremity, x-ray ankle, vascular duplex lower extremity venous left.  Vital signs in the encounter shows blood pressure 147/63, the rest of the vital signs within normal limit. Physical exam reveals swelling present on the right lower leg, edema, left lower leg swelling present, and edema present, right ankle swelling, left

## 2024-11-26 NOTE — ED PROVIDER NOTES
I performed a history and physical examination of the patient and discussed management with the resident. I reviewed the resident’s note and agree with the documented findings and plan of care. Any areas of disagreement are noted on the chart. I was personally present for the key portions of any procedures. I have documented in the chart those procedures where I was not present during the key portions. I have reviewed the emergency nurses triage note. I agree with the chief complaint, past medical history, past surgical history, allergies, medications, social and family history as documented unless otherwise noted below. Documentation of the HPI, Physical Exam and Medical Decision Making performed by medical students or scribes is based on my personal performance of the HPI, PE and MDM. For Phys Assistant/ Nurse Practitioner cases/documentation I have personally evaluated this patient and have completed at least one if not all key elements of the E/M (history, physical exam, and MDM). My findings are as noted below.    In other words, I personally saw and examined the patient I have reviewed and agreed with the resident findings including all diagnostic interpretations and treatment plans as written.  I was present for the key portion of any procedures performed and the inclusive time noted in any critical care statement.    Patient presents today for left leg swelling.  This is a 74-year-old male with a history of dementia does not ambulate very well but he does transition and pivot.  He had osteomyelitis of the right leg back in 2012 he had been continually fighting this.  He is currently on Duricef for a month and then Bactrim for a month this month he is on Duricef.  He sees Dr. Jackman.  Patient has not had any fevers at home.  Wife at bedside states that he has had swelling of the left lower extremity.  It has been that way for several weeks.  She noticed some weeping today.  So she decided to bring him in.  She

## 2024-11-26 NOTE — ED PROVIDER NOTES
[facility] EMERGENCY DEPT  UrgentCare Encounter      CHIEFCOMPLAINT       Chief Complaint   Patient presents with    Leg Swelling     \" A long time have checked for DVT hospitalized in May for . Has oesteomylitis right leg under care Dr Jackman\" x 6 months.  Redness and draining clear liquid started week ago Dr Jackman  wants a culture        Nurses Notes reviewed and I agree except as noted in the HPI.  HISTORY OF PRESENT ILLNESS     Esau Fan is a 74 y.o. male who presents to the urgent care for evaluation.  Wife is the primary historian due to patient's Alzheimer's.  She states that his left lower extremity has been swollen for 6 months.  However recently developed weeping and redness.  Feels much cooler to the touch. Chronic osteomyelitis of right tibia, follows with Dr. Jackman. He is on Bactrim and cefadroxil alternating.      The patient/patient representative has no other acute complaints at this time.    REVIEW OF SYSTEMS     Review of Systems   Cardiovascular:  Positive for leg swelling.   Skin:  Positive for wound.     PAST MEDICAL HISTORY         Diagnosis Date    Carpal tunnel syndrome on left     Dementia (HCC)     Dyspepsia     and heartburn    GERD (gastroesophageal reflux disease)     Hiatal hernia     HTN (hypertension) 10/01/2003    isolated systolic    Neuroma     right foot    Osteomyelitis     right leg    S/P arthroscopy     right thumb    Smoking history 1969 to 2006    2 ppd    Solitary pulmonary nodule     Tinnitus        SURGICAL HISTORY     Patient  has a past surgical history that includes Appendectomy.    CURRENT MEDICATIONS       Previous Medications    CEFADROXIL (DURICEF PO)    Take by mouth Started 11/1/24 for 1 month then switch back to bactrim    MEMANTINE (NAMENDA) 5 MG TABLET    Take 1 tablet by mouth 2 times daily    MIRTAZAPINE (REMERON) 7.5 MG TABLET    Take 1 tablet by mouth nightly    NICOTINE (NICODERM CQ) 14 MG/24HR    Place 1 patch onto the skin daily    OMEPRAZOLE      Vitals:    11/26/24 1404   BP: (!) 151/73   Pulse: (!) 109   Resp: 14   Temp: 96.8 °F (36 °C)   SpO2: 95%   Weight: 71.2 kg (157 lb)   Height: 1.829 m (6')       Medications - No data to display  PROCEDURES:  FINALIMPRESSION      1. Swelling of left lower extremity        DISPOSITION/PLAN   DISPOSITION Decision To Transfer 11/26/2024 02:25:23 PM                Problem List Items Addressed This Visit    None  Visit Diagnoses       Swelling of left lower extremity    -  Primary              After reviewing the patients History of Present illness, Vital Signs,Clinical Findings,Comorbities, and Clinical Data obtained I discussed with the patient or patient representative that there is a very real potential for serious injury / illness and the patient will need to be transfer to a level of higher care for further evaluation and continued care. It was explained that this would provide the best care for the patient.   The patient/patient representative are agreeable to the treatment plan and are agreeable to be transferred therefore, the patient will be transferred to [facility] ED for reevaluation and further management.        PATIENT REFERRED TO:  [facility], EMERGENCY DEPT  [address] [place] [address]  [phone]      GO DIRECTLY TO THE EMERGENCY DEPARTMENT, for further evalation, do not eat or drink prior to arrival      JESUS ALBERTO Cannon - CNP    Please note that some or all of this chart was generated using [employer] voice recognition software. Although every effort was made to ensure the accuracy of this automated transcription, some errors in transcription may have occurred.         Huma Ceja APRN - CNP  11/26/24 1428       Huma Ceja APRN - CNP  11/26/24 1434

## 2024-11-27 ENCOUNTER — APPOINTMENT (OUTPATIENT)
Dept: GENERAL RADIOLOGY | Age: 74
DRG: 603 | End: 2024-11-27
Payer: MEDICARE

## 2024-11-27 PROBLEM — R53.81 PHYSICAL DECONDITIONING: Status: ACTIVE | Noted: 2024-11-27

## 2024-11-27 PROBLEM — L97.821 ULCER OF LEFT MEDIAL LOWER EXTREMITY, LIMITED TO BREAKDOWN OF SKIN (HCC): Status: ACTIVE | Noted: 2024-11-27

## 2024-11-27 PROBLEM — N18.2 STAGE 2 CHRONIC KIDNEY DISEASE: Status: ACTIVE | Noted: 2024-11-27

## 2024-11-27 PROBLEM — G30.9 MODERATE ALZHEIMER'S DEMENTIA (HCC): Status: ACTIVE | Noted: 2024-11-27

## 2024-11-27 PROBLEM — M86.60 CHRONIC OSTEOMYELITIS: Status: ACTIVE | Noted: 2024-11-27

## 2024-11-27 PROBLEM — M79.89 SWELLING OF LEFT LOWER EXTREMITY: Status: ACTIVE | Noted: 2024-11-27

## 2024-11-27 PROBLEM — E87.1 HYPONATREMIA: Status: ACTIVE | Noted: 2024-11-27

## 2024-11-27 PROBLEM — F02.B0 MODERATE ALZHEIMER'S DEMENTIA (HCC): Status: ACTIVE | Noted: 2024-11-27

## 2024-11-27 LAB
ANION GAP SERPL CALC-SCNC: 9 MEQ/L (ref 8–16)
BASOPHILS ABSOLUTE: 0 THOU/MM3 (ref 0–0.1)
BASOPHILS NFR BLD AUTO: 0.3 %
BUN SERPL-MCNC: 14 MG/DL (ref 7–22)
CALCIUM SERPL-MCNC: 8.5 MG/DL (ref 8.5–10.5)
CHLORIDE SERPL-SCNC: 97 MEQ/L (ref 98–111)
CO2 SERPL-SCNC: 24 MEQ/L (ref 23–33)
CREAT SERPL-MCNC: 0.8 MG/DL (ref 0.4–1.2)
DEPRECATED RDW RBC AUTO: 45.1 FL (ref 35–45)
EOSINOPHIL NFR BLD AUTO: 3.4 %
EOSINOPHILS ABSOLUTE: 0.2 THOU/MM3 (ref 0–0.4)
ERYTHROCYTE [DISTWIDTH] IN BLOOD BY AUTOMATED COUNT: 13.3 % (ref 11.5–14.5)
GFR SERPL CREATININE-BSD FRML MDRD: > 90 ML/MIN/1.73M2
GLUCOSE SERPL-MCNC: 79 MG/DL (ref 70–108)
HCT VFR BLD AUTO: 34.3 % (ref 42–52)
HGB BLD-MCNC: 12 GM/DL (ref 14–18)
IMM GRANULOCYTES # BLD AUTO: 0.03 THOU/MM3 (ref 0–0.07)
IMM GRANULOCYTES NFR BLD AUTO: 0.5 %
LYMPHOCYTES ABSOLUTE: 1.4 THOU/MM3 (ref 1–4.8)
LYMPHOCYTES NFR BLD AUTO: 21 %
MCH RBC QN AUTO: 32.3 PG (ref 26–33)
MCHC RBC AUTO-ENTMCNC: 35 GM/DL (ref 32.2–35.5)
MCV RBC AUTO: 92.5 FL (ref 80–94)
MONOCYTES ABSOLUTE: 0.5 THOU/MM3 (ref 0.4–1.3)
MONOCYTES NFR BLD AUTO: 8.1 %
NEUTROPHILS ABSOLUTE: 4.3 THOU/MM3 (ref 1.8–7.7)
NEUTROPHILS NFR BLD AUTO: 66.7 %
NRBC BLD AUTO-RTO: 0 /100 WBC
ORIGINAL SAMPLE NUMBER: NORMAL
OSMOLALITY SERPL CALC.SUM OF ELEC: 260.2 MOSMOL/KG (ref 275–300)
PLATELET # BLD AUTO: 236 THOU/MM3 (ref 130–400)
PMV BLD AUTO: 9.3 FL (ref 9.4–12.4)
POTASSIUM SERPL-SCNC: 4.8 MEQ/L (ref 3.5–5.2)
RBC # BLD AUTO: 3.71 MILL/MM3 (ref 4.7–6.1)
REFERENCE LOCATION: NORMAL
REFERENCE RANGE: NORMAL
SODIUM SERPL-SCNC: 130 MEQ/L (ref 135–145)
SODIUM SERPL-SCNC: 130 MEQ/L (ref 135–145)
SODIUM SERPL-SCNC: 131 MEQ/L (ref 135–145)
TEST RESULTS WITH UNITS: NORMAL
TEST(S) BEING PERFORMED: NORMAL
WBC # BLD AUTO: 6.5 THOU/MM3 (ref 4.8–10.8)

## 2024-11-27 PROCEDURE — 73590 X-RAY EXAM OF LOWER LEG: CPT

## 2024-11-27 PROCEDURE — 6360000002 HC RX W HCPCS: Performed by: STUDENT IN AN ORGANIZED HEALTH CARE EDUCATION/TRAINING PROGRAM

## 2024-11-27 PROCEDURE — 97166 OT EVAL MOD COMPLEX 45 MIN: CPT

## 2024-11-27 PROCEDURE — 84295 ASSAY OF SERUM SODIUM: CPT

## 2024-11-27 PROCEDURE — 99233 SBSQ HOSP IP/OBS HIGH 50: CPT | Performed by: PHYSICIAN ASSISTANT

## 2024-11-27 PROCEDURE — 36415 COLL VENOUS BLD VENIPUNCTURE: CPT

## 2024-11-27 PROCEDURE — 2580000003 HC RX 258: Performed by: STUDENT IN AN ORGANIZED HEALTH CARE EDUCATION/TRAINING PROGRAM

## 2024-11-27 PROCEDURE — 80048 BASIC METABOLIC PNL TOTAL CA: CPT

## 2024-11-27 PROCEDURE — 99222 1ST HOSP IP/OBS MODERATE 55: CPT | Performed by: INTERNAL MEDICINE

## 2024-11-27 PROCEDURE — 85025 COMPLETE CBC W/AUTO DIFF WBC: CPT

## 2024-11-27 PROCEDURE — 6370000000 HC RX 637 (ALT 250 FOR IP): Performed by: PHYSICIAN ASSISTANT

## 2024-11-27 PROCEDURE — 2580000003 HC RX 258: Performed by: PHYSICIAN ASSISTANT

## 2024-11-27 PROCEDURE — 6360000002 HC RX W HCPCS: Performed by: PHYSICIAN ASSISTANT

## 2024-11-27 PROCEDURE — 97530 THERAPEUTIC ACTIVITIES: CPT

## 2024-11-27 PROCEDURE — 1200000003 HC TELEMETRY R&B

## 2024-11-27 PROCEDURE — 99223 1ST HOSP IP/OBS HIGH 75: CPT | Performed by: STUDENT IN AN ORGANIZED HEALTH CARE EDUCATION/TRAINING PROGRAM

## 2024-11-27 PROCEDURE — 97535 SELF CARE MNGMENT TRAINING: CPT

## 2024-11-27 PROCEDURE — 1200000000 HC SEMI PRIVATE

## 2024-11-27 RX ADMIN — WATER 2000 MG: 1 INJECTION INTRAMUSCULAR; INTRAVENOUS; SUBCUTANEOUS at 00:25

## 2024-11-27 RX ADMIN — MIRTAZAPINE 7.5 MG: 7.5 TABLET, FILM COATED ORAL at 20:27

## 2024-11-27 RX ADMIN — DOXYCYCLINE HYCLATE 100 MG: 100 TABLET, COATED ORAL at 20:27

## 2024-11-27 RX ADMIN — ENOXAPARIN SODIUM 40 MG: 100 INJECTION SUBCUTANEOUS at 20:27

## 2024-11-27 RX ADMIN — SODIUM CHLORIDE, PRESERVATIVE FREE 10 ML: 5 INJECTION INTRAVENOUS at 14:40

## 2024-11-27 RX ADMIN — CEFEPIME 2000 MG: 2 INJECTION, POWDER, FOR SOLUTION INTRAVENOUS at 14:40

## 2024-11-27 RX ADMIN — SODIUM CHLORIDE, PRESERVATIVE FREE 10 ML: 5 INJECTION INTRAVENOUS at 20:28

## 2024-11-27 RX ADMIN — MEMANTINE 5 MG: 5 TABLET ORAL at 20:26

## 2024-11-27 NOTE — ED NOTES
Phone call placed to VIVIANA Jones to approve pt transport to [facility] in stable condition.

## 2024-11-27 NOTE — ED NOTES
Pt and family updated on care plan, verbal understanding given. No other needs or concerns at this time.

## 2024-11-27 NOTE — ED NOTES
ED to inpatient nurses report      Chief Complaint:  Chief Complaint   Patient presents with    Leg Swelling     \" A long time have checked for DVT hospitalized in May for . Has oesteomylitis right leg under care Dr Jackman\" x 6 months.  Redness and draining clear liquid started week ago Dr Jackman  wants a culture      Present to ED from: Home    MOA:     LOC: alert and orientated to name and place  Mobility: Requires assistance * 2  Oxygen Baseline: RA    Current needs required: RA     Code Status:   Prior    What abnormal results were found and what did you give/do to treat them? Swelling of left lower extremity  Any procedures or intervention occur? Home    Mental Status:  Level of Consciousness: Alert (0)    Psych Assessment:        Vitals:  Patient Vitals for the past 24 hrs:   BP Temp Pulse Resp SpO2 Height Weight   11/26/24 1916 (!) 148/78 -- 51 13 99 % -- --   11/26/24 1851 (!) 151/62 -- 59 20 -- -- --   11/26/24 1651 104/85 -- 55 18 99 % -- --   11/26/24 1535 (!) 147/60 -- 64 17 99 % -- --   11/26/24 1404 (!) 151/73 96.8 °F (36 °C) (!) 109 14 95 % 1.829 m (6') 71.2 kg (157 lb)        LDAs:   Peripheral IV 11/26/24 Right Antecubital (Active)   Site Assessment Clean, dry & intact 11/26/24 1917   Line Status Normal saline locked 11/26/24 1917   Phlebitis Assessment No symptoms 11/26/24 1917   Infiltration Assessment 0 11/26/24 1917   Dressing Status Clean, dry & intact 11/26/24 1917       Ambulatory Status:  Presents to emergency department  because of falls (Syncope, seizure, or loss of consciousness): No, Age > 70: Yes, Altered Mental Status, Intoxication with alcohol or substance confusion (Disorientation, impaired judgment, poor safety awaremess, or inability to follow instructions): Yes, Impaired Mobility: Ambulates or transfers with assistive devices or assistance; Unable to ambulate or transer.: Yes, Nursing Judgement: Yes    Diagnosis:  DISPOSITION Decision To Admit 11/26/2024 07:12:07 PM   Final

## 2024-11-27 NOTE — CONSULTS
Hx and P/E :Infectious D.       Patient - Esau Fan,  Age - 74 y.o.    - 1950      Room Number - 7K-09/009-A   N -  [id number]   [facility] # - [id number]  Date of Admission -  2024  2:00 PM  Patient's PCP: No primary care provider on file.     Requesting Physician: Ayanna Radford PA-C    CHIEF COMPLAINT:     Left lower extremity cellulitis with associated skin breakdown    ASSESSMENT AND PLAN     Patient is a 74-year-old male who I am consulted given concern for left lower extremity cellulitis with medial tissue ulceration.    Patient is chronically on therapy with cefadroxil and Bactrim which he receives 1 month courses that alternate, this has been ongoing since .  Generally I do not recommend treatment of chronic osteomyelitis as outcomes data does not show improved mortality with 6 weeks versus longer courses.  I will defer this treatment to the outpatient physician.  With the use of Bactrim and an elderly patient, I would be concerned for adverse renal manifestations.  Patient has most recently been on therapy with cefadroxil.  I would have some suspicion for more resistant gram-negative's as well as Pseudomonas especially given the appearance of the ulceration on the medial surface.    Other considerations would include arterial versus venous disease.  It would be reasonable to check Dopplers and arterial studies.    Continue therapy with cefepime and doxycycline  Consider resistance of typical microorganisms associate with cellulitis of the lower extremity  Consider arterial or venous work up, will defer to primary service  Duration of therapy to be decided  Obtain x-ray of right lower extremity to confirm no hardware in place   If no hardware present, would consider the utility of prolonged antibiotics for potential chronic osteo      HISTORY OF PRESENT ILLNESS       This is a very pleasant 74 y.o. male who was admitted to the hospital with a chief  input(s): \"PHOS\" in the last 72 hours.  BNP:No results for input(s): \"BNP\" in the last 72 hours.  Glucose:No results for input(s): \"POCGLU\" in the last 72 hours.  HgbA1C: No results for input(s): \"LABA1C\" in the last 72 hours.  INR: No results for input(s): \"INR\" in the last 72 hours.  Hepatic: No results for input(s): \"ALKPHOS\", \"ALT\", \"AST\", \"BILITOT\", \"BILIDIR\", \"LABALBU\" in the last 72 hours.    Invalid input(s): \"PROT\"  Amylase and Lipase:No results for input(s): \"LACTA\", \"AMYLASE\" in the last 72 hours.  Lactic Acid: No results for input(s): \"LACTA\" in the last 72 hours.  Troponin: No results for input(s): \"CKTOTAL\", \"CKMB\", \"TROPONINI\" in the last 72 hours.  BNP: No results for input(s): \"BNP\" in the last 72 hours.  Lipids: No results for input(s): \"CHOL\", \"TRIG\", \"HDL\", \"LDL\" in the last 72 hours.    Invalid input(s): \"LDLCALC\"  ABGs: No results found for: \"PHART\", \"PO2ART\", \"GET2KPN\", \"[id number]\", \"BEART\"    Cultures: Reviewed cultures     CXR: I independently interpreted chest x-ray obtained this admission      UA: No results for input(s): \"SPECGRAV\", \"PHUR\", \"COLORU\", \"CLARITYU\", \"MUCUS\", \"PROTEINU\", \"BLOODU\", \"RBCUA\", \"WBCUA\", \"BACTERIA\", \"NITRU\", \"GLUCOSEU\", \"BILIRUBINUR\", \"UROBILINOGEN\", \"KETUA\", \"LABCAST\", \"LABCASTTY\", \"AMORPHOS\" in the last 72 hours.    Invalid input(s): \"CRYSTALS\"      Imaging: Reviewed left lower extremity imaging of cellulitis and ordered x-ray imaging of tibia-fibula of right lower    Micro: No results found for: \"BC\"    Patient Active Problem List   Diagnosis    HTN (hypertension)    Smoking history    Carpal tunnel syndrome on left    Hiatal hernia    GERD (gastroesophageal reflux disease)    Solitary pulmonary nodule    Dyspepsia    Neuroma    S/P arthroscopy    Tinnitus    Depression with suicidal ideation    Dementia with behavioral disturbance (HCC)    Cellulitis       Mandeep Reilly MD, MD, 11/27/2024 6:32 AM

## 2024-11-27 NOTE — PROGRESS NOTES
[facility] [place]  SPEECH THERAPY MISSED TREATMENT NOTE  [facility]      Date: 2024  Patient Name: Esau Fan        MRN: [id number]    : 1950  (74 y.o.)    REASON FOR MISSED TREATMENT:  ST attempted to see patient for completion of CSE with AMBER Humphreys approval. RN reports patient becomes combative when woken from sleep. Upon ST attempt, patient sleeping soundly in bed with family at bedside. Family request ST to re-attempt when patient is awake. ST to re-attempt as patient is medically appropriate and available.    Sonia Bonner MS, CCC-SLP [id number]

## 2024-11-27 NOTE — PROGRESS NOTES
Hospitalist Progress Note      Patient:  Esau Fan 74 y.o. male       : 1950  Unit/Bed:[facility]    Date of Admission: 2024      ASSESSMENT AND PLAN    Active Problems  LLE cellulitis with medial tissue ulceration   In setting of chronic antibiotic use for chronic RLE osteo, raising concern for more resistant bacteria or Pseudomonas.   ID consulted - continue cefepime, doxycycline.   Has chronic BLE edema - likely has some chronic venous stasis / lymphedema. Venous US in 2024 was negative for DVT. BNP wnl.   Hyponatremia   Family reports excessive po fluid intake.   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   Continue fluid restriction. Strict I/Os  BMP in AM.     Chronic Conditions (reviewed, stable, and home medications resumed, unless otherwise stated)  Chronic RLE osteomyelitis. Follows with Dr. Jackman (ID). Alternates cefadroxil and Bactrim the first of every month, currently on cefadroxil.   Dementia. High risk for hospital acquired delirium. Encourage good sleep/wake cycle. Delirium precautions.   Essential HTN.   H/o tobacco use.   GERD      LDA: []CVC / []PICC / []Midline / []Nath / []Drains / []Mediport / [x]None  Antibiotics: yes  Steroids: no  Labs (still needed?): [x]Yes / []No  IVF (still needed?): []Yes / [x]No    Level of care: []Step Down / [x]Med-Surg  Bed Status: [x]Inpatient / []Observation  Telemetry: [x]Yes / []No  PT/OT: [x]Yes / []No    DVT Prophylaxis: [x] Lovenox / [] Heparin / [] SCDs / [] Already on Systemic Anticoagulation / [] None     Expected discharge date:  1-2 days  Disposition: Home with wife. Bedside commode ordered.      Code status: Full Code     ===================================================================    Chief Complaint: Redness and swelling of the left lower extremity   Subjective (past 24 hours):   Patient resting comfortably. Arouses easily to verbal stimuli. History comes from wife at bedside. Wife  Oral 2 times per day    memantine  5 mg Oral BID    mirtazapine  7.5 mg Oral Nightly    pantoprazole  40 mg Oral QAM AC    rivastigmine  1 patch TransDERmal Daily    hydrALAZINE  12.5 mg Oral Daily    PRN Meds: sodium chloride flush, sodium chloride, potassium chloride **OR** potassium alternative oral replacement **OR** [DISCONTINUED] potassium chloride, magnesium sulfate, ondansetron **OR** ondansetron, polyethylene glycol, acetaminophen **OR** acetaminophen, risperiDONE, traZODone    Exam:  /68   Pulse 57   Temp 97.7 °F (36.5 °C) (Axillary)   Resp 18   Ht 1.829 m (6')   Wt 71.2 kg (157 lb)   SpO2 95%   BMI 21.29 kg/m²   General:  No distress, appears stated age. Chronically ill appearing.   Eyes:  PERRL. Conjunctivae/corneas clear.  HENT: Head normal appearing. Nares normal. Oral mucosa moist.  Hearing intact.   Neck: Supple, with full range of motion. Trachea midline.  No gross JVD appreciated.  Respiratory:  Normal effort. Clear to auscultation, without rales or wheezes or rhonchi.  Cardiovascular: Normal rate, regular rhythm with normal S1/S2 without murmurs.    BLE edema, L>R  Abdomen: Soft, non-tender, non-distended with normal bowel sounds.  Musculoskeletal: No joint swelling or tenderness. Normal tone. No abnormal movements.   Skin: Warm and dry. RLE erythema, medial calf ulcer.   Neurologic:  No focal sensory/motor deficits in the upper or lower extremities. Cranial nerves:  grossly non-focal 2-12.     Psychiatric: Arouses to verbal stimuli. Not oriented.   Capillary Refill: Brisk,< 3 seconds.      Labs/Radiology: See chart or assessment above.     Electronically signed by Ayanna Radford PA-C on 11/27/2024 at 3:26 PM

## 2024-11-27 NOTE — PLAN OF CARE
Problem: Discharge Planning  Goal: Discharge to home or other facility with appropriate resources  Outcome: Progressing     Problem: Confusion  Goal: Confusion, delirium, dementia, or psychosis is improved or at baseline  Description: INTERVENTIONS:  1. Assess for possible contributors to thought disturbance, including medications, impaired vision or hearing, underlying metabolic abnormalities, dehydration, psychiatric diagnoses, and notify attending LIP  2. [place] high risk fall precautions, as indicated  3. Provide frequent short contacts to provide reality reorientation, refocusing and direction  4. Decrease environmental stimuli, including noise as appropriate  5. Monitor and intervene to maintain adequate nutrition, hydration, elimination, sleep and activity  6. If unable to ensure safety without constant attention obtain sitter and review sitter guidelines with assigned personnel  7. Initiate Psychosocial CNS and Spiritual Care consult, as indicated  Outcome: Progressing     Problem: Safety - Adult  Goal: Free from fall injury  Outcome: Progressing     Problem: ABCDS Injury Assessment  Goal: Absence of physical injury  Outcome: Progressing

## 2024-11-27 NOTE — PROGRESS NOTES
[facility]  PHYSICAL THERAPY MISSED TREATMENT NOTE  [facility]    Date: 2024  Patient Name: Esau Fan        MRN: [id number]   : 1950  (74 y.o.)  Gender: male                REASON FOR MISSED TREATMENT:  Hold treatment per nursing request.  Per communication with nursing staff, if pt sleeping let him rest as he did not get much sleep last night. Pt was sleeping at entry, did not awaken, will check back as able.

## 2024-11-27 NOTE — H&P
Hospitalist History & Physical    Patient:  Esau Fan    Unit/Bed:7K-09/009-A  YOB: 1950  MRN: [id number]   PCP: No primary care provider on file.  Code Status: Full Code    Date of Service: The patient was seen and examined on 11/26/24 and admitted to Inpatient with an expected length of stay of greater than two midnights due to medical therapy.     Chief Complaint: Redness and swelling of the left lower extremity    Assessment/Plan:    Cellulitis of the left lower extremity  In the setting of chronic alternating antibiotic use.  Discussed with infectious disease.  Start Rocephin and doxycycline.  Infectious disease consulted for assistance with management given chronic antibiotic use.  Hypoosmolar hyponatremia  Euvolemic vs hypervolemic. Does have some lower extremity edema on exam which is chronic.  Suspect may be secondary to excessive oral intake of liquids with poor solute intake as patient's wife reports he drinks a significant amount of liquids on a daily basis with a little food intake.  Check urine sodium, urine osmolality.  1500 mL fluid restriction.  Check sodium every 4 hours.  Goal correction 4 to 6 mEq in the first 24 hours been  Urology consulted for assistance with management.  TSH within normal limits.  Dementia  Continue home regimen.  Patient's wife does endorse some coughing with oral intake.  Concern for possible aspiration.  SLP evaluation.  Hypertension  Continue hydralazine  Chronic osteomyelitis of the right lower extremity  Secondary to prior surgical invention of the right lower extremity.  Follows with infectious disease outpatient.  Alternates cefadroxil and Bactrim chronically.  Currently taking cefadroxil.  Alternates in the first of every month.  Gastroesophageal reflux disease  Continue PPI  History of nicotine use  Quit approximately 1 month ago.  Per wife, not currently using nicotine replacement therapy.    History of Present Illness:  Esau Fan is  medications on file prior to encounter.     Current Outpatient Medications on File Prior to Encounter   Medication Sig Dispense Refill    Sulfamethoxazole-Trimethoprim (BACTRIM DS PO) Take by mouth X 1 month  started (on sice 2012)      Cefadroxil (DURICEF PO) Take by mouth Started 11/1/24 for 1 month then switch back to bactrim      memantine (NAMENDA) 5 MG tablet Take 1 tablet by mouth 2 times daily 60 tablet 0    mirtazapine (REMERON) 7.5 MG tablet Take 1 tablet by mouth nightly 30 tablet 0    omeprazole (PRILOSEC) 20 MG delayed release capsule Take 1 capsule by mouth 2 times daily 60 capsule 0    rivastigmine (EXELON) 4.6 MG/24HR Place 1 patch onto the skin daily 30 patch 0    traZODone (DESYREL) 50 MG tablet Take 1 tablet by mouth nightly as needed for Sleep 30 tablet 0    risperiDONE (RISPERDAL) 0.5 MG tablet Take 1 tablet by mouth 2 times daily as needed (as needed for agitation) 20 tablet 0    nicotine (NICODERM CQ) 14 MG/24HR Place 1 patch onto the skin daily (Patient not taking: Reported on 11/26/2024) 30 patch 3    pantoprazole (PROTONIX) 40 MG tablet Take 1 tablet by mouth every morning (before breakfast) 30 tablet 3       Allergies:    Pcn [penicillins] and Vancomycin    Social History:    reports that he has been smoking cigarettes. He started smoking about 46 years ago. He has a 36.3 pack-year smoking history. He has never used smokeless tobacco. He reports that he does not drink alcohol and does not use drugs.    Family History:   History reviewed. No pertinent family history.    Diet:  ADULT DIET; Regular; 1500 ml    Physical Exam:  BP (!) 159/70   Pulse 61   Temp 97.7 °F (36.5 °C) (Oral)   Resp 19   Ht 1.829 m (6')   Wt 71.2 kg (157 lb)   SpO2 98%   BMI 21.29 kg/m²   General: Alert, in no acute distress, cooperative  HEENT:  Normocephalic and atraumatic.  No scleral icterus. External nares without deformity.  External ears normal. Mucous membranes are moist.  Neck: Trachea midline. No

## 2024-11-27 NOTE — CARE COORDINATION
Case Management Assessment Initial Evaluation    Date/Time of Evaluation: 11/27/2024 8:44 AM  Assessment Completed by: Manuela Rogers RN    If patient is discharged prior to next notation, then this note serves as note for discharge by case management.    Patient Name: Esau Fan                   YOB: 1950  Diagnosis: Cellulitis [L03.90]  Swelling of left lower extremity [M79.89]                   Date / Time: 11/26/2024  2:00 PM  Location: [facility]/009     Patient Admission Status: Inpatient   Readmission Risk Low 0-14, Mod 15-19), High > 20: Readmission Risk Score: 13.1    Current PCP:  Jhonny Sharif MD from [facility]  Health Care Decision Makers:   Primary Decision Maker: Marialuisa Fan - Spouse - [phone]    Additional Case Management Notes: to ED c/o left leg redness swelling and drainage. WIfe states she spoke with Dr Gasper mays and the recommend urgent care and a wound culture. Wife states \"I caught our dog licking it\"   PMH: HTN, GERD, dementia, osteomyelitis, solitary pulmonary nodules, depression with suicidal ideation     Na levels 123, 131, 130, nephrology and ID consulted. On Cefepime IV and po Doxycycline. SLP eval attempted and pt became combative with staff when awakened. Risperidal prn for agitation.  Procedures: none    Imaging:   Xray left tibia fibula: no fracture  LLE Venous Dopplers: No deep vein thrombosis.     Patient Goals/Plan/Treatment Preferences: Met with pt wife who is registered nurse; she completes total care for her  at home all except he can feed himself. He has had dementia for last 3.5 years per wife and daughter. Has some DME, HCDM, insurance verified. Wife requested BSC so pt can assist with getting  up from commode d/t generalized weakness and debilitated. Will follow for additional needs as family unsure today.      Esau Fan requires a bedside commode due to being confined to one level of the home and there are no toilet facilities on that  level, and is physically incapable of utilizing regular toilet facilities due to imbalance and generalized weakness. Current body weight: Weight - Scale: 71.2 kg (157 lb).          11/27/24 1029   Service Assessment   Patient Orientation Alert and Oriented;Person;Place   Cognition Long Term Memory Deficit   History Provided By Spouse   Primary Caregiver Self   Accompanied By/Relationship wife, daughter, grandson   Support Systems Spouse/Significant Other;Children   Patient's Healthcare Decision Maker is: Legal Next of Kin   PCP Verified by CM Yes   Last Visit to PCP Within last 6 months   Prior Functional Level Assistance with the following:;Bathing;Dressing;Cooking;Housework;Shopping;Mobility   Current Functional Level Assistance with the following:;Bathing;Dressing;Cooking;Housework;Shopping;Mobility   Can patient return to prior living arrangement Unknown at present   Ability to make needs known: Poor   Family able to assist with home care needs: Yes   Would you like for me to discuss the discharge plan with any other family members/significant others, and if so, who? No   Financial Resources Medicare   Community Resources None   CM/SW Referral DME;ADLs/IADLs   Social/Functional History   Lives With Spouse   Type of Home House   Home Layout Two level;Able to Live on Main level with bedroom/bathroom   Active  No   Patient's  Info wife drives   Discharge Planning   Type of Residence House   Living Arrangements Spouse/Significant Other;Other (Comment)  (grand daughter there also)   Current Services Prior To Admission Durable Medical Equipment   Current DME Prior to Arrival Cane;Walker;Wheelchair  (standard walker)   Potential Assistance Needed Durable Medical Equipment  (req BSC)   Potential DME Needed Bedside Commode   DME Ordered? No   Potential Assistance Purchasing Medications No   Type of Home Care Services None   Patient expects to be discharged to: House   Follow Up Appointment: Best Day/Time

## 2024-11-27 NOTE — PLAN OF CARE
Problem: Discharge Planning  Goal: Discharge to home or other facility with appropriate resources  11/27/2024 1618 by Petr Cruz RN  Outcome: Progressing  Flowsheets (Taken 11/27/2024 1431)  Discharge to home or other facility with appropriate resources: Identify barriers to discharge with patient and caregiver  11/27/2024 0237 by Cheyanne Corona RN  Outcome: Progressing     Problem: Confusion  Goal: Confusion, delirium, dementia, or psychosis is improved or at baseline  Description: INTERVENTIONS:  1. Assess for possible contributors to thought disturbance, including medications, impaired vision or hearing, underlying metabolic abnormalities, dehydration, psychiatric diagnoses, and notify attending LIP  2. [place] high risk fall precautions, as indicated  3. Provide frequent short contacts to provide reality reorientation, refocusing and direction  4. Decrease environmental stimuli, including noise as appropriate  5. Monitor and intervene to maintain adequate nutrition, hydration, elimination, sleep and activity  6. If unable to ensure safety without constant attention obtain sitter and review sitter guidelines with assigned personnel  7. Initiate Psychosocial CNS and Spiritual Care consult, as indicated  11/27/2024 1618 by Petr Cruz RN  Outcome: Progressing  Flowsheets (Taken 11/27/2024 1431)  Effect of thought disturbance (confusion, delirium, dementia, or psychosis) are managed with adequate functional status: Assess for contributors to thought disturbance, including medications, impaired vision or hearing, underlying metabolic abnormalities, dehydration, psychiatric diagnoses, notify LIP  11/27/2024 0237 by Cheyanne Corona RN  Outcome: Progressing     Problem: Safety - Adult  Goal: Free from fall injury  11/27/2024 1618 by Petr Cruz RN  Outcome: Progressing  Flowsheets (Taken 11/27/2024 1616)  Free From Fall Injury: Instruct family/caregiver on patient safety  11/27/2024 0237 by  Cheyanne Corona, RN  Outcome: Progressing     Problem: ABCDS Injury Assessment  Goal: Absence of physical injury  11/27/2024 1618 by Petr Cruz, RN  Outcome: Progressing  Flowsheets (Taken 11/27/2024 1616)  Absence of Physical Injury: Implement safety measures based on patient assessment  11/27/2024 0237 by Cheyanne Corona, RN  Outcome: Progressing     Problem: Skin/Tissue Integrity  Goal: Absence of new skin breakdown  Description: 1.  Monitor for areas of redness and/or skin breakdown  2.  Assess vascular access sites hourly  3.  Every 4-6 hours minimum:  Change oxygen saturation probe site  4.  Every 4-6 hours:  If on nasal continuous positive airway pressure, respiratory therapy assess nares and determine need for appliance change or resting period.  Outcome: Progressing

## 2024-11-27 NOTE — CONSULTS
[facility]    [address] [place], [place] [address]  [phone]     Hospital Consult  11/27/2024 10:47 AM    Pt Name:    Esau Fan  MRN:     [id number]   [id number]  YOB: 1950  Admit Date:    11/26/2024  2:00 PM  Primary Care Physician:  No primary care provider on file.        Reason for Consult:  Hyponatremia    History:   The patient is a 74 y.o. male seen in renal consult for hyponatremia. He has a hx of HTN, chronic osteomyelitis of right leg, dementia, and as below.   He presented to the hospital with left left redness and swelling and diagnosed with cellulitis.  On admission his sodium was noted to be 123 but was slightly hemolyzed specimen.  He was placed on fluid restriction.  Subsequent sodium levels have been ranging around 130-131.   Per family he drinks a lot of fluids.  Urine osmol 356, urine sodium 93. He takes trazodone and risperidone at home.    Past Medical History:  Past Medical History:   Diagnosis Date    Carpal tunnel syndrome on left     Dementia (HCC)     Dyspepsia     and heartburn    GERD (gastroesophageal reflux disease)     Hiatal hernia     HTN (hypertension) 10/01/2003    isolated systolic    Neuroma     right foot    Osteomyelitis     right leg    S/P arthroscopy     right thumb    Smoking history 1969 to 2006    2 ppd    Solitary pulmonary nodule     Tinnitus        Past Surgical History:  Past Surgical History:   Procedure Laterality Date    APPENDECTOMY         Family History:  History reviewed. No pertinent family history.    Social History:  Social History     Socioeconomic History    Marital status:      Spouse name: Not on file    Number of children: Not on file    Years of education: Not on file    Highest education level: Not on file   Occupational History    Not on file   Tobacco Use    Smoking status: Some Days     Current packs/day: 0.50     Average packs/day: 1.5 packs/day for 24.6 years (36.3 ttl pk-yrs)     Types:  for input(s): \"LABALBU\", \"AST\", \"ALT\", \"BILITOT\", \"ALKPHOS\" in the last 72 hours.    Invalid input(s): \"ALB\"  ABGs: No results found for: \"PHART\", \"PO2ART\", \"HBT4VRB\"  Troponin: No results for input(s): \"TROPONINI\" in the last 72 hours.  BNP: No results for input(s): \"BNP\" in the last 72 hours.    Old labs reviewed.       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 Had a slight overcorrection but has remaining around 130 which is appropriate.  Continue fluid restriction for now    2. Left leg cellulitis with chronic osteomyelitis of right leg  3. HTN  4. Dementia    Thank you for allowing me to participate in the care of this patient. Please feel free to call me if you have any questions.     Electronically signed by Louisa Schroeder DO on 11/27/24 at 10:47 AM EST    [facility]

## 2024-11-28 LAB
ANION GAP SERPL CALC-SCNC: 8 MEQ/L (ref 8–16)
BUN SERPL-MCNC: 18 MG/DL (ref 7–22)
CALCIUM SERPL-MCNC: 8.9 MG/DL (ref 8.5–10.5)
CHLORIDE SERPL-SCNC: 96 MEQ/L (ref 98–111)
CO2 SERPL-SCNC: 26 MEQ/L (ref 23–33)
CREAT SERPL-MCNC: 1.1 MG/DL (ref 0.4–1.2)
GFR SERPL CREATININE-BSD FRML MDRD: 69 ML/MIN/1.73M2
GLUCOSE SERPL-MCNC: 88 MG/DL (ref 70–108)
POTASSIUM SERPL-SCNC: 4.6 MEQ/L (ref 3.5–5.2)
SODIUM SERPL-SCNC: 130 MEQ/L (ref 135–145)

## 2024-11-28 PROCEDURE — 6360000002 HC RX W HCPCS: Performed by: PHYSICIAN ASSISTANT

## 2024-11-28 PROCEDURE — 6370000000 HC RX 637 (ALT 250 FOR IP): Performed by: INTERNAL MEDICINE

## 2024-11-28 PROCEDURE — 36415 COLL VENOUS BLD VENIPUNCTURE: CPT

## 2024-11-28 PROCEDURE — 2580000003 HC RX 258: Performed by: STUDENT IN AN ORGANIZED HEALTH CARE EDUCATION/TRAINING PROGRAM

## 2024-11-28 PROCEDURE — 1200000000 HC SEMI PRIVATE

## 2024-11-28 PROCEDURE — 99232 SBSQ HOSP IP/OBS MODERATE 35: CPT | Performed by: STUDENT IN AN ORGANIZED HEALTH CARE EDUCATION/TRAINING PROGRAM

## 2024-11-28 PROCEDURE — 6370000000 HC RX 637 (ALT 250 FOR IP): Performed by: PHYSICIAN ASSISTANT

## 2024-11-28 PROCEDURE — 6360000002 HC RX W HCPCS: Performed by: STUDENT IN AN ORGANIZED HEALTH CARE EDUCATION/TRAINING PROGRAM

## 2024-11-28 PROCEDURE — 80048 BASIC METABOLIC PNL TOTAL CA: CPT

## 2024-11-28 PROCEDURE — 2580000003 HC RX 258: Performed by: PHYSICIAN ASSISTANT

## 2024-11-28 PROCEDURE — 99232 SBSQ HOSP IP/OBS MODERATE 35: CPT | Performed by: INTERNAL MEDICINE

## 2024-11-28 RX ORDER — SODIUM CHLORIDE 1 G/1
2 TABLET ORAL ONCE
Status: COMPLETED | OUTPATIENT
Start: 2024-11-28 | End: 2024-11-28

## 2024-11-28 RX ADMIN — ENOXAPARIN SODIUM 40 MG: 100 INJECTION SUBCUTANEOUS at 20:34

## 2024-11-28 RX ADMIN — SODIUM CHLORIDE, PRESERVATIVE FREE 10 ML: 5 INJECTION INTRAVENOUS at 20:34

## 2024-11-28 RX ADMIN — RISPERIDONE 0.5 MG: 0.25 TABLET, FILM COATED ORAL at 00:11

## 2024-11-28 RX ADMIN — SODIUM CHLORIDE, PRESERVATIVE FREE 10 ML: 5 INJECTION INTRAVENOUS at 10:37

## 2024-11-28 RX ADMIN — MEMANTINE 5 MG: 5 TABLET ORAL at 20:35

## 2024-11-28 RX ADMIN — TRAZODONE HYDROCHLORIDE 50 MG: 50 TABLET ORAL at 01:24

## 2024-11-28 RX ADMIN — DOXYCYCLINE HYCLATE 100 MG: 100 TABLET, COATED ORAL at 20:35

## 2024-11-28 RX ADMIN — CEFEPIME 2000 MG: 2 INJECTION, POWDER, FOR SOLUTION INTRAVENOUS at 14:46

## 2024-11-28 RX ADMIN — TRAZODONE HYDROCHLORIDE 50 MG: 50 TABLET ORAL at 20:35

## 2024-11-28 RX ADMIN — MIRTAZAPINE 7.5 MG: 7.5 TABLET, FILM COATED ORAL at 20:35

## 2024-11-28 RX ADMIN — CEFEPIME 2000 MG: 2 INJECTION, POWDER, FOR SOLUTION INTRAVENOUS at 01:30

## 2024-11-28 RX ADMIN — SODIUM CHLORIDE 2 G: 1 TABLET ORAL at 20:35

## 2024-11-28 NOTE — PLAN OF CARE
Problem: Discharge Planning  Goal: Discharge to home or other facility with appropriate resources  11/28/2024 1246 by Juany Dudley, RN  Outcome: Not Progressing  Flowsheets (Taken 11/28/2024 1246)  Discharge to home or other facility with appropriate resources:   Identify barriers to discharge with patient and caregiver   Identify discharge learning needs (meds, wound care, etc)   Refer to discharge planning if patient needs post-hospital services based on physician order or complex needs related to functional status, cognitive ability or social support system   Arrange for needed discharge resources and transportation as appropriate  11/28/2024 0600 by Bong Steven, AMBER  Outcome: Progressing     Problem: Confusion  Goal: Confusion, delirium, dementia, or psychosis is improved or at baseline  Description: INTERVENTIONS:  1. Assess for possible contributors to thought disturbance, including medications, impaired vision or hearing, underlying metabolic abnormalities, dehydration, psychiatric diagnoses, and notify attending LIP  2. [place] high risk fall precautions, as indicated  3. Provide frequent short contacts to provide reality reorientation, refocusing and direction  4. Decrease environmental stimuli, including noise as appropriate  5. Monitor and intervene to maintain adequate nutrition, hydration, elimination, sleep and activity  6. If unable to ensure safety without constant attention obtain sitter and review sitter guidelines with assigned personnel  7. Initiate Psychosocial CNS and Spiritual Care consult, as indicated  11/28/2024 1246 by Juany Dudley, RN  Outcome: Not Progressing  Flowsheets (Taken 11/28/2024 1246)  Effect of thought disturbance (confusion, delirium, dementia, or psychosis) are managed with adequate functional status:   Assess for contributors to thought disturbance, including medications, impaired vision or hearing, underlying metabolic abnormalities, dehydration, psychiatric  Implement safety measures based on patient assessment  11/28/2024 0600 by Bong Steven RN  Outcome: Progressing     Problem: Skin/Tissue Integrity  Goal: Absence of new skin breakdown  Description: 1.  Monitor for areas of redness and/or skin breakdown  2.  Assess vascular access sites hourly  3.  Every 4-6 hours minimum:  Change oxygen saturation probe site  4.  Every 4-6 hours:  If on nasal continuous positive airway pressure, respiratory therapy assess nares and determine need for appliance change or resting period.  Outcome: Not Progressing     Care plan reviewed with patient. Patient verbalizes understanding of plan of care and contributes to goal setting.

## 2024-11-28 NOTE — PROGRESS NOTES
[facility]         Progress Note      Esau Fan  MEDICAL RECORD NUMBER:  [id number]  AGE: 74 y.o.   GENDER: male  : 1950  EPISODE DATE:  2024      Assessment:     Patient is a 74-year-old male who I am consulted given concern for left lower extremity cellulitis with medial tissue ulceration.     Patient is chronically on therapy with cefadroxil and Bactrim which he receives 1 month courses that alternate, this has been ongoing since .  Generally I do not recommend treatment of chronic osteomyelitis as outcomes data does not show improved mortality with 6 weeks versus longer courses.  I will defer this treatment to the outpatient physician.  With the use of Bactrim and an elderly patient, I would be concerned for adverse renal manifestations.  Patient has most recently been on therapy with cefadroxil.  I would have some suspicion for more resistant gram-negative's as well as Pseudomonas especially given the appearance of the ulceration on the medial surface.     Will defer vascular workup as potential cause of ulceration to primary service.     Continue therapy with cefepime and doxycycline  Consider arterial or venous work up, will defer to primary service  Duration of therapy to be decided  Reviewed x-ray which demonstrates no indwelling heart      Subjective:     Chief Complaint   Patient presents with    Leg Swelling     \" A long time have checked for DVT hospitalized in May for . Has oesteomylitis right leg under care Dr Jackman\" x 6 months.  Redness and draining clear liquid started week ago Dr Jackman  wants a culture       No acute events overnight.  Patient is currently on therapy with cefepime and doxycycline and tolerating without issue.  He does have dementia, case discussed with family.        Patient Active Problem List   Diagnosis Code    HTN (hypertension) I10    Smoking history Z87.891    Carpal tunnel syndrome on left G56.02    Hiatal hernia K44.9

## 2024-11-28 NOTE — PROGRESS NOTES
[facility]  INPATIENT OCCUPATIONAL THERAPY  [facility] ORTHOPEDICS 7K  EVALUATION      Discharge Recommendations: Continue to assess pending progress  Equipment Recommendations: No        Time In: 1545  Time Out: 1630  Timed Code Treatment Minutes: 30 Minutes  Minutes: 45          Date: 2024  Patient Name: Esau Fan,   Gender: male      MRN: [id number]  : 1950  (74 y.o.)  Referring Practitioner: Iftikhar Baron PA-C  Diagnosis: Cellulitis  Additional Pertinent Hx: Pt with a history of chronic right osteomyelitis of the right lower extremity, dementia, hypertension, and GERD who presented to [facility] with chief complaint of redness and swelling of the left lower extremity.  History is provided by the patient's wife as the patient has dementia and is alert and oriented to person only at baseline.  The patient is on antibiotics chronically for a history of chronic osteomyelitis of the right lower extremity.  He alternates Bactrim and cefadroxil.  More recently, he has been alternating agents every month.  For the month of November, he has been on cefadroxil.  He follows with Dr. Jcakman at [facility].  Over the last week or so, the patient has had redness and swelling of the left lower extremity.  No fevers or chills.  The patient's wife does report that she did witness their dog licking the left lower extremity after the redness had already been present.  The left lower extremity has been draining some serous appearing fluid.  Additionally, the patient was found to be hyponatremic.  He is not currently on any diuretics.  The patient's wife does report he drinks a significant amount of fluids on a daily basis and eats very little.  No current smoking or alcohol use.  She does report he quit smoking approximately 1 month ago.    Restrictions/Precautions:  Restrictions/Precautions: Fall Risk    Subjective  Chart Reviewed: Yes, Orders, History and Physical  Family / Caregiver Present: Yes (Spouse present

## 2024-11-28 NOTE — PROGRESS NOTES
Hospitalist Progress Note      Patient:  Esau Fan 74 y.o. male       : 1950  Unit/Bed:[facility]    Date of Admission: 2024      ASSESSMENT AND PLAN    Active Problems  LLE cellulitis with medial tissue ulceration   In setting of chronic antibiotic use for chronic RLE osteo, raising concern for more resistant bacteria or Pseudomonas.   ID consulted - continue cefepime, doxycycline.   Has chronic BLE edema - likely has some chronic venous stasis / lymphedema. Venous US in 2024 was negative for DVT. BNP wnl. Will get arterial doppler given medial ulceration.   Improving, continue current ABX per ID.   Hyponatremia   Family reports excessive po fluid intake.   Nephrology consulted - possible SIADH, on trazodone and Risperdal PRN.   Continue fluid restriction. Strict I/Os  Planning to given one time salt tab today.   BMP in AM.     Chronic Conditions (reviewed, stable, and home medications resumed, unless otherwise stated)  Chronic RLE osteomyelitis. Follows with Dr. Jackman (ID). Alternates cefadroxil and Bactrim the first of every month, currently on cefadroxil.   Dementia. High risk for hospital acquired delirium. Encourage good sleep/wake cycle. Delirium precautions.   Essential HTN.   H/o tobacco use.   GERD      LDA: []CVC / []PICC / []Midline / []Nath / []Drains / []Mediport / [x]None  Antibiotics: yes  Steroids: no  Labs (still needed?): [x]Yes / []No  IVF (still needed?): []Yes / [x]No    Level of care: []Step Down / [x]Med-Surg  Bed Status: [x]Inpatient / []Observation  Telemetry: [x]Yes / []No  PT/OT: [x]Yes / []No    DVT Prophylaxis: [x] Lovenox / [] Heparin / [] SCDs / [] Already on Systemic Anticoagulation / [] None     Expected discharge date:  1-2 days  Disposition: Home with wife. Bedside commode ordered.      Code status: Full Code     ===================================================================    Chief Complaint: Redness and swelling of the left lower extremity  Posterior tibial pulses present. Pedal pulses difficult to assess.       Labs/Radiology: See chart or assessment above.     Electronically signed by Ayanna Radford PA-C on 11/28/2024 at 7:24 AM

## 2024-11-28 NOTE — PROGRESS NOTES
Physician Progress Note      PATIENT:               ANN GALINDO  [facility] #:                  [id number]  :                       1950  ADMIT DATE:       2024 2:00 PM  DISCH DATE:  RESPONDING  PROVIDER #:        Ayanna Tillman PA-C          QUERY TEXT:    Patient admitted with Cellulitis . Documentation reflects SIADH in Consult   note dated .  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 Had a slight overcorrection but has   remaining around 130 which is appropriate.  Continue fluid restriction for now  progressnote--Additionally, the patient was found to be hyponatremic.  He   is not currently on any diuretics.  plasma sodium-129,123,131,130,131 plasma osmolality-250.4 urine sodium-93  Treatment:serial lab monitoring for sodium,plasma osmolality,urine   osmolality,urine sodium ,nephrology consult,fluid restriction  Thank you  ALEX Olea,CDS  Options provided:  -- SIADH confirmed after study  -- SIADH  ruled out after study  -- Other - I will add my own diagnosis  -- Disagree - Not applicable / Not valid  -- Disagree - Clinically unable to determine / Unknown  -- Refer to Clinical Documentation Reviewer    PROVIDER RESPONSE TEXT:    SIADH confirmed after study.    Query created by: Emmie Emanuel on 2024 1:46 AM      Electronically signed by:  Ayanna Tillman PA-C 2024 7:24 AM

## 2024-11-28 NOTE — PLAN OF CARE
Problem: Discharge Planning  Goal: Discharge to home or other facility with appropriate resources  11/28/2024 0600 by Bong Steven RN  Outcome: Progressing  11/27/2024 1618 by Petr Cruz RN  Outcome: Progressing  Flowsheets (Taken 11/27/2024 1431)  Discharge to home or other facility with appropriate resources: Identify barriers to discharge with patient and caregiver     Problem: Confusion  Goal: Confusion, delirium, dementia, or psychosis is improved or at baseline  Description: INTERVENTIONS:  1. Assess for possible contributors to thought disturbance, including medications, impaired vision or hearing, underlying metabolic abnormalities, dehydration, psychiatric diagnoses, and notify attending LIP  2. [place] high risk fall precautions, as indicated  3. Provide frequent short contacts to provide reality reorientation, refocusing and direction  4. Decrease environmental stimuli, including noise as appropriate  5. Monitor and intervene to maintain adequate nutrition, hydration, elimination, sleep and activity  6. If unable to ensure safety without constant attention obtain sitter and review sitter guidelines with assigned personnel  7. Initiate Psychosocial CNS and Spiritual Care consult, as indicated  11/28/2024 0600 by Bong Steven RN  Outcome: Progressing  11/27/2024 1618 by Petr Cruz RN  Outcome: Progressing  Flowsheets (Taken 11/27/2024 1431)  Effect of thought disturbance (confusion, delirium, dementia, or psychosis) are managed with adequate functional status: Assess for contributors to thought disturbance, including medications, impaired vision or hearing, underlying metabolic abnormalities, dehydration, psychiatric diagnoses, notify LIP     Problem: Safety - Adult  Goal: Free from fall injury  11/28/2024 0600 by Bong Steven RN  Outcome: Progressing  11/27/2024 1618 by Petr Cruz RN  Outcome: Progressing  Flowsheets (Taken 11/27/2024 1616)  Free From Fall Injury: Instruct  family/caregiver on patient safety     Problem: ABCDS Injury Assessment  Goal: Absence of physical injury  11/28/2024 0600 by Bong Steven, RN  Outcome: Progressing  11/27/2024 1618 by Petr Cruz, RN  Outcome: Progressing  Flowsheets (Taken 11/27/2024 1616)  Absence of Physical Injury: Implement safety measures based on patient assessment     Problem: Skin/Tissue Integrity  Goal: Absence of new skin breakdown  Description: 1.  Monitor for areas of redness and/or skin breakdown  2.  Assess vascular access sites hourly  3.  Every 4-6 hours minimum:  Change oxygen saturation probe site  4.  Every 4-6 hours:  If on nasal continuous positive airway pressure, respiratory therapy assess nares and determine need for appliance change or resting period.  11/27/2024 1618 by Petr Cruz, RN  Outcome: Progressing     Care plan reviewed with patient and Patient verbalize understanding of the plan of care and contribute to goal setting.

## 2024-11-28 NOTE — PROGRESS NOTES
[facility]   Nephrology progress note  11/28/2024, 11:27 AM      Pt Name:    Esau Fan  MRN:     [id number]     YOB: 1950  Admit Date:    11/26/2024  2:00 PM    Chief Complaint: Nephrology following for hyponatremia    Subjective:  Patient was seen and examined this morning  No chest pain or shortness of breath reported  On room air  Family member at the bedside    Objective:  24HR INTAKE/OUTPUT:    Intake/Output Summary (Last 24 hours) at 11/28/2024 1127  Last data filed at 11/28/2024 0419  Gross per 24 hour   Intake 610 ml   Output 500 ml   Net 110 ml         I/O last 3 completed shifts:  In: 1250 [P.O.:1240; I.V.:10]  Out: 1500 [Urine:1500]  No intake/output data recorded.   Admission weight: 71.2 kg (157 lb)  Wt Readings from Last 3 Encounters:   11/26/24 71.2 kg (157 lb)   08/21/24 93.4 kg (206 lb)   05/10/24 93.4 kg (206 lb)        Vitals :   Vitals:    11/27/24 2000 11/27/24 2350 11/28/24 0533 11/28/24 1032   BP: 139/88 (!) 144/90 136/74 (!) 133/51   Pulse: 72 69 79 53   Resp: 16 16 16 14   Temp: 98.1 °F (36.7 °C) 97.8 °F (36.6 °C) 97.9 °F (36.6 °C) 98.4 °F (36.9 °C)   TempSrc: Axillary Axillary Axillary Oral   SpO2: 95% 94% 94% 94%   Weight:       Height:           Physical examination  General Appearance: Frail, cachectic appearance.  Muscle wasting  Mouth/Throat: Oral mucosa dry  Neck: No JVD  Lungs: Air entry B/L, no rales, no use of accessory muscles  Heart:  S1, S2 heard  GI: soft, non-tender, no guarding    Medications:  Infusion:    sodium chloride       Meds:    cefepime  2,000 mg IntraVENous Q12H    sodium chloride flush  5-40 mL IntraVENous 2 times per day    enoxaparin  40 mg SubCUTAneous Q24H    doxycycline hyclate  100 mg Oral 2 times per day    memantine  5 mg Oral BID    mirtazapine  7.5 mg Oral Nightly    pantoprazole  40 mg Oral QAM AC    rivastigmine  1 patch TransDERmal Daily    hydrALAZINE  12.5 mg Oral Daily     Meds prn: sodium chloride flush,

## 2024-11-28 NOTE — PROGRESS NOTES
Pharmacy Note - Extended Infusion Beta-Lactam Dose Adjustment    Cefepime 2000 mg q12h extended infusion for treatment of Skin and soft tissue infection. Per [facility] Extended Infusion Beta-Lactam Policy, cefepime will be changed to   2000 mg q24h extended infusion    Estimated Creatinine Clearance: Estimated Creatinine Clearance: 58 mL/min (based on SCr of 1.1 mg/dL).      BMI: Body mass index is 21.29 kg/m².    Rationale for Adjustment: Dose adjusted per [facility] Extended Infusion Policy based on renal function and indication. The above medication is renally eliminated and demonstrates time-dependent effects on bacterial eradication. Extended-infusion dosing strategy aims to enhance microbiologic and clinical efficacy.     Pharmacy will monitor renal function daily and adjust dose as necessary.      Please call with any questions.    Thank you,  Kiley Dove, PharmD 11/28/2024 5:15 PM

## 2024-11-29 ENCOUNTER — APPOINTMENT (OUTPATIENT)
Dept: GENERAL RADIOLOGY | Age: 74
DRG: 603 | End: 2024-11-29
Payer: MEDICARE

## 2024-11-29 ENCOUNTER — APPOINTMENT (OUTPATIENT)
Age: 74
DRG: 603 | End: 2024-11-29
Attending: INTERNAL MEDICINE
Payer: MEDICARE

## 2024-11-29 LAB
ANION GAP SERPL CALC-SCNC: 11 MEQ/L (ref 8–16)
BUN SERPL-MCNC: 17 MG/DL (ref 7–22)
CALCIUM SERPL-MCNC: 8.6 MG/DL (ref 8.5–10.5)
CHLORIDE SERPL-SCNC: 97 MEQ/L (ref 98–111)
CO2 SERPL-SCNC: 24 MEQ/L (ref 23–33)
CREAT SERPL-MCNC: 1 MG/DL (ref 0.4–1.2)
ECHO AR MAX VEL PISA: 4.6 M/S
ECHO AV CUSP MM: 2.1 CM
ECHO AV PEAK GRADIENT: 11 MMHG
ECHO AV PEAK VELOCITY: 1.6 M/S
ECHO AV REGURGITANT PHT: 535 MS
ECHO AV VELOCITY RATIO: 0.75
ECHO BSA: 1.9 M2
ECHO LA AREA 2C: 11.5 CM2
ECHO LA AREA 4C: 14.6 CM2
ECHO LA DIAMETER INDEX: 1.72 CM/M2
ECHO LA DIAMETER: 3.3 CM
ECHO LA MAJOR AXIS: 5.4 CM
ECHO LA MINOR AXIS: 3.9 CM
ECHO LA VOL MOD A2C: 28 ML (ref 18–58)
ECHO LA VOL MOD A4C: 31 ML (ref 18–58)
ECHO LA VOLUME INDEX MOD A2C: 15 ML/M2 (ref 16–34)
ECHO LA VOLUME INDEX MOD A4C: 16 ML/M2 (ref 16–34)
ECHO LV E' LATERAL VELOCITY: 4.6 CM/S
ECHO LV E' SEPTAL VELOCITY: 5 CM/S
ECHO LV EJECTION FRACTION BIPLANE: 58 % (ref 55–100)
ECHO LV FRACTIONAL SHORTENING: 31 % (ref 28–44)
ECHO LV INTERNAL DIMENSION DIASTOLE INDEX: 2.71 CM/M2
ECHO LV INTERNAL DIMENSION DIASTOLIC: 5.2 CM (ref 4.2–5.9)
ECHO LV INTERNAL DIMENSION SYSTOLIC INDEX: 1.88 CM/M2
ECHO LV INTERNAL DIMENSION SYSTOLIC: 3.6 CM
ECHO LV IVSD: 0.8 CM (ref 0.6–1)
ECHO LV MASS 2D: 145.2 G (ref 88–224)
ECHO LV MASS INDEX 2D: 75.6 G/M2 (ref 49–115)
ECHO LV POSTERIOR WALL DIASTOLIC: 0.8 CM (ref 0.6–1)
ECHO LV RELATIVE WALL THICKNESS RATIO: 0.31
ECHO LVOT PEAK GRADIENT: 6 MMHG
ECHO LVOT PEAK VELOCITY: 1.2 M/S
ECHO MV A VELOCITY: 0.85 M/S
ECHO MV E DECELERATION TIME (DT): 329 MS
ECHO MV E VELOCITY: 0.69 M/S
ECHO MV E/A RATIO: 0.81
ECHO MV E/E' LATERAL: 15
ECHO MV E/E' RATIO (AVERAGED): 14.4
ECHO MV E/E' SEPTAL: 13.8
ECHO MV REGURGITANT PEAK GRADIENT: 58 MMHG
ECHO MV REGURGITANT PEAK VELOCITY: 3.8 M/S
ECHO PV MAX VELOCITY: 0.9 M/S
ECHO PV PEAK GRADIENT: 3 MMHG
ECHO RV INTERNAL DIMENSION: 2.5 CM
ECHO RV TAPSE: 2.6 CM (ref 1.7–?)
ECHO TV E WAVE: 0.7 M/S
ECHO TV REGURGITANT MAX VELOCITY: 2.59 M/S
ECHO TV REGURGITANT PEAK GRADIENT: 27 MMHG
GFR SERPL CREATININE-BSD FRML MDRD: 78 ML/MIN/1.73M2
GLUCOSE SERPL-MCNC: 154 MG/DL (ref 70–108)
MAGNESIUM SERPL-MCNC: 1.9 MG/DL (ref 1.6–2.4)
POTASSIUM SERPL-SCNC: 4.2 MEQ/L (ref 3.5–5.2)
SODIUM SERPL-SCNC: 132 MEQ/L (ref 135–145)
URATE SERPL-MCNC: 5.7 MG/DL (ref 3.7–7)

## 2024-11-29 PROCEDURE — 36415 COLL VENOUS BLD VENIPUNCTURE: CPT

## 2024-11-29 PROCEDURE — 6360000002 HC RX W HCPCS: Performed by: STUDENT IN AN ORGANIZED HEALTH CARE EDUCATION/TRAINING PROGRAM

## 2024-11-29 PROCEDURE — 92610 EVALUATE SWALLOWING FUNCTION: CPT

## 2024-11-29 PROCEDURE — 92526 ORAL FUNCTION THERAPY: CPT

## 2024-11-29 PROCEDURE — 1200000000 HC SEMI PRIVATE

## 2024-11-29 PROCEDURE — 6370000000 HC RX 637 (ALT 250 FOR IP): Performed by: PHYSICIAN ASSISTANT

## 2024-11-29 PROCEDURE — 84550 ASSAY OF BLOOD/URIC ACID: CPT

## 2024-11-29 PROCEDURE — 6360000002 HC RX W HCPCS: Performed by: PHYSICIAN ASSISTANT

## 2024-11-29 PROCEDURE — 99232 SBSQ HOSP IP/OBS MODERATE 35: CPT | Performed by: INTERNAL MEDICINE

## 2024-11-29 PROCEDURE — 6370000000 HC RX 637 (ALT 250 FOR IP): Performed by: INTERNAL MEDICINE

## 2024-11-29 PROCEDURE — 99232 SBSQ HOSP IP/OBS MODERATE 35: CPT | Performed by: STUDENT IN AN ORGANIZED HEALTH CARE EDUCATION/TRAINING PROGRAM

## 2024-11-29 PROCEDURE — 83735 ASSAY OF MAGNESIUM: CPT

## 2024-11-29 PROCEDURE — 80048 BASIC METABOLIC PNL TOTAL CA: CPT

## 2024-11-29 PROCEDURE — 2580000003 HC RX 258: Performed by: STUDENT IN AN ORGANIZED HEALTH CARE EDUCATION/TRAINING PROGRAM

## 2024-11-29 PROCEDURE — 2580000003 HC RX 258: Performed by: PHYSICIAN ASSISTANT

## 2024-11-29 PROCEDURE — 93306 TTE W/DOPPLER COMPLETE: CPT

## 2024-11-29 PROCEDURE — 71045 X-RAY EXAM CHEST 1 VIEW: CPT

## 2024-11-29 PROCEDURE — 93306 TTE W/DOPPLER COMPLETE: CPT | Performed by: NUCLEAR MEDICINE

## 2024-11-29 RX ORDER — SODIUM CHLORIDE 1 G/1
2 TABLET ORAL ONCE
Status: COMPLETED | OUTPATIENT
Start: 2024-11-29 | End: 2024-11-29

## 2024-11-29 RX ADMIN — ENOXAPARIN SODIUM 40 MG: 100 INJECTION SUBCUTANEOUS at 21:03

## 2024-11-29 RX ADMIN — ACETAMINOPHEN 650 MG: 325 TABLET ORAL at 08:55

## 2024-11-29 RX ADMIN — SODIUM CHLORIDE 2 G: 1 TABLET ORAL at 14:12

## 2024-11-29 RX ADMIN — TRAZODONE HYDROCHLORIDE 50 MG: 50 TABLET ORAL at 23:51

## 2024-11-29 RX ADMIN — MIRTAZAPINE 7.5 MG: 7.5 TABLET, FILM COATED ORAL at 21:03

## 2024-11-29 RX ADMIN — DOXYCYCLINE HYCLATE 100 MG: 100 TABLET, COATED ORAL at 08:55

## 2024-11-29 RX ADMIN — SODIUM CHLORIDE, PRESERVATIVE FREE 10 ML: 5 INJECTION INTRAVENOUS at 21:04

## 2024-11-29 RX ADMIN — MEMANTINE 5 MG: 5 TABLET ORAL at 08:55

## 2024-11-29 RX ADMIN — HYDRALAZINE HYDROCHLORIDE 12.5 MG: 25 TABLET ORAL at 08:55

## 2024-11-29 RX ADMIN — PANTOPRAZOLE SODIUM 40 MG: 40 TABLET, DELAYED RELEASE ORAL at 06:38

## 2024-11-29 RX ADMIN — DOXYCYCLINE HYCLATE 100 MG: 100 TABLET, COATED ORAL at 21:03

## 2024-11-29 RX ADMIN — MEMANTINE 5 MG: 5 TABLET ORAL at 21:03

## 2024-11-29 RX ADMIN — CEFEPIME 2000 MG: 2 INJECTION, POWDER, FOR SOLUTION INTRAVENOUS at 13:40

## 2024-11-29 ASSESSMENT — PAIN SCALES - WONG BAKER: WONGBAKER_NUMERICALRESPONSE: HURTS LITTLE MORE

## 2024-11-29 ASSESSMENT — PAIN - FUNCTIONAL ASSESSMENT: PAIN_FUNCTIONAL_ASSESSMENT: ACTIVITIES ARE NOT PREVENTED

## 2024-11-29 ASSESSMENT — PAIN SCALES - GENERAL: PAINLEVEL_OUTOF10: 10

## 2024-11-29 ASSESSMENT — PAIN DESCRIPTION - LOCATION: LOCATION: BACK

## 2024-11-29 ASSESSMENT — PAIN DESCRIPTION - PAIN TYPE: TYPE: ACUTE PAIN

## 2024-11-29 ASSESSMENT — PAIN DESCRIPTION - FREQUENCY: FREQUENCY: INTERMITTENT

## 2024-11-29 ASSESSMENT — PAIN DESCRIPTION - ORIENTATION: ORIENTATION: LOWER

## 2024-11-29 ASSESSMENT — PAIN DESCRIPTION - DESCRIPTORS: DESCRIPTORS: ACHING

## 2024-11-29 ASSESSMENT — PAIN DESCRIPTION - ONSET: ONSET: ON-GOING

## 2024-11-29 NOTE — PLAN OF CARE
stimuli, including noise as appropriate     Problem: Safety - Adult  Goal: Free from fall injury  11/29/2024 1037 by Cassie Villalta RN  Outcome: Progressing  Flowsheets (Taken 11/28/2024 1246 by Juany Dudley, RN)  Free From Fall Injury: Instruct family/caregiver on patient safety  11/29/2024 0528 by oBng Steven RN  Outcome: Not Progressing     Problem: ABCDS Injury Assessment  Goal: Absence of physical injury  11/29/2024 1037 by Cassie Villalta RN  Outcome: Progressing  Flowsheets (Taken 11/28/2024 1246 by Juany Dudley, RN)  Absence of Physical Injury: Implement safety measures based on patient assessment  11/29/2024 0528 by Bong Steven RN  Outcome: Not Progressing     Problem: Skin/Tissue Integrity  Goal: Absence of new skin breakdown  Description: 1.  Monitor for areas of redness and/or skin breakdown  2.  Assess vascular access sites hourly  3.  Every 4-6 hours minimum:  Change oxygen saturation probe site  4.  Every 4-6 hours:  If on nasal continuous positive airway pressure, respiratory therapy assess nares and determine need for appliance change or resting period.  11/29/2024 1037 by Cassie Villalta RN  Outcome: Progressing  11/29/2024 0528 by Bong Steven RN  Outcome: Not Progressing     Problem: Pain  Goal: Verbalizes/displays adequate comfort level or baseline comfort level  Outcome: Progressing  Flowsheets (Taken 11/29/2024 1037)  Verbalizes/displays adequate comfort level or baseline comfort level:   Encourage patient to monitor pain and request assistance   Administer analgesics based on type and severity of pain and evaluate response   Consider cultural and social influences on pain and pain management   Assess pain using appropriate pain scale   Implement non-pharmacological measures as appropriate and evaluate response   Notify Licensed Independent Practitioner if interventions unsuccessful or patient reports new pain     Problem: Safety - Adult  Goal: Free from fall  injury  11/29/2024 1037 by Cassie Villalta RN  Outcome: Progressing  Flowsheets (Taken 11/28/2024 1246 by Juany Dudley, RN)  Free From Fall Injury: Instruct family/caregiver on patient safety  11/29/2024 0528 by Bong Steven, RN  Outcome: Not Progressing     Problem: ABCDS Injury Assessment  Goal: Absence of physical injury  11/29/2024 1037 by Cassie Villalta RN  Outcome: Progressing  Flowsheets (Taken 11/28/2024 1246 by Juany Dudley, RN)  Absence of Physical Injury: Implement safety measures based on patient assessment  11/29/2024 0528 by Bong Steven, RN  Outcome: Not Progressing     Problem: Skin/Tissue Integrity  Goal: Absence of new skin breakdown  Description: 1.  Monitor for areas of redness and/or skin breakdown  2.  Assess vascular access sites hourly  3.  Every 4-6 hours minimum:  Change oxygen saturation probe site  4.  Every 4-6 hours:  If on nasal continuous positive airway pressure, respiratory therapy assess nares and determine need for appliance change or resting period.  11/29/2024 1037 by Cassie Villalta RN  Outcome: Progressing  11/29/2024 0528 by Bong Steven RN  Outcome: Not Progressing

## 2024-11-29 NOTE — CARE COORDINATION
11/29/24, 12:46 PM EST    DISCHARGE ON GOING EVALUATION    Esau Fan       [facility] day: 3  Location: -09/009-A Reason for admit: Cellulitis [L03.90]  Swelling of left lower extremity [M79.89]     Procedures:   Echo ordered  Mod Ba swallow pending    Imaging since last note:   11/27 xray tibia fibula:  Deformity of the tibia consistent with chronic osteomyelitis. Evidence of   previous instrumentation in the distal tibia.  11/29 PCXR:  Mild cardiomegaly. Moderate size hiatus hernia.   Tiny bilateral pleural effusions. Moderate interstitial pneumonia/edema   involving both mid and lower lung fields, worse on the right. Superimposed   interstitial fibrosis in either lung cannot be excluded. Overall appearance of chest has worsened since prior study.     Barriers to Discharge: SLP eval needed, ID follows, nephrology, hospitalist, ID follows. Cefepime IV daily. Doxycycline oral.    PCP: Jhonny Sharif MD from [facility]   Readmission Risk Score: 14.3    Patient Goals/Plan/Treatment Preferences: from home with wife; BSC ordered and delivered. Wife declined HH early in stay. Will follow.

## 2024-11-29 NOTE — PROGRESS NOTES
[facility] Infectious Disease         Progress Note      Esau Fan  MEDICAL RECORD NUMBER:  [id number]  AGE: 74 y.o.   GENDER: male  : 1950  EPISODE DATE:  2024      Assessment:     Patient is a 74-year-old male who I am consulted given concern for left lower extremity cellulitis with medial tissue ulceration.     Patient is chronically on therapy with cefadroxil and Bactrim which he receives 1 month courses that alternate, this has been ongoing since .  Generally I do not recommend treatment of chronic osteomyelitis as outcomes data does not show improved mortality with 6 weeks versus longer courses.  I will defer this treatment to the outpatient physician.  With the use of Bactrim and an elderly patient, I would be concerned for adverse renal manifestations.  Patient has most recently been on therapy with cefadroxil.  I would have some suspicion for more resistant gram-negative's as well as Pseudomonas especially given the appearance of the ulceration on the medial surface.     Will defer vascular workup as potential cause of ulceration to primary service.  Discussed with family and will continue to monitor for 1 additional day, reasonable to consider discharge on .     Continue therapy with cefepime and doxycycline  Consider arterial or venous work up, will defer to primary service   Vascular duplex of arteries ordered  Duration of therapy to be decided-likely 7 days  Reviewed x-ray which demonstrates no indwelling hardware      Subjective:     Chief Complaint   Patient presents with    Leg Swelling     \" A long time have checked for DVT hospitalized in May for . Has oesteomylitis right leg under care Dr Jackman\" x 6 months.  Redness and draining clear liquid started week ago Dr Jackman  wants a culture       No acute events overnight.  Patient is currently on therapy with cefepime and doxycycline.  Discussed potential transition to oral regimen.  Family aware of risks with  facility-administered medications on file prior to encounter.     Current Outpatient Medications on File Prior to Encounter   Medication Sig Dispense Refill    Sulfamethoxazole-Trimethoprim (BACTRIM DS PO) Take by mouth X 1 month  started (on sice 2012)      Cefadroxil (DURICEF PO) Take by mouth Started 11/1/24 for 1 month then switch back to bactrim      memantine (NAMENDA) 5 MG tablet Take 1 tablet by mouth 2 times daily 60 tablet 0    mirtazapine (REMERON) 7.5 MG tablet Take 1 tablet by mouth nightly 30 tablet 0    omeprazole (PRILOSEC) 20 MG delayed release capsule Take 1 capsule by mouth 2 times daily 60 capsule 0    rivastigmine (EXELON) 4.6 MG/24HR Place 1 patch onto the skin daily 30 patch 0    traZODone (DESYREL) 50 MG tablet Take 1 tablet by mouth nightly as needed for Sleep 30 tablet 0    risperiDONE (RISPERDAL) 0.5 MG tablet Take 1 tablet by mouth 2 times daily as needed (as needed for agitation) 20 tablet 0    nicotine (NICODERM CQ) 14 MG/24HR Place 1 patch onto the skin daily (Patient not taking: Reported on 11/26/2024) 30 patch 3    pantoprazole (PROTONIX) 40 MG tablet Take 1 tablet by mouth every morning (before breakfast) 30 tablet 3       REVIEW OF SYSTEMS    Unable to obtain due to mentation      Objective:      /66   Pulse 84   Temp 97.6 °F (36.4 °C) (Axillary)   Resp 16   Ht 1.829 m (6')   Wt 71.2 kg (157 lb)   SpO2 94%   BMI 21.29 kg/m²     Wt Readings from Last 3 Encounters:   11/26/24 71.2 kg (157 lb)   08/21/24 93.4 kg (206 lb)   05/10/24 93.4 kg (206 lb)         There is no immunization history on file for this patient.    PHYSICAL EXAM    /66   Pulse 84   Temp 97.6 °F (36.4 °C) (Axillary)   Resp 16   Ht 1.829 m (6')   Wt 71.2 kg (157 lb)   SpO2 94%   BMI 21.29 kg/m²   General:  Awake, alert, not in distress.  HEENT: pink conjunctiva, unicteric sclera, moist oral mucosa.  Chest:  bilateral air entry, Clear to auscultation,   Cardiovascular:  RRR ,S1S2, no murmur or

## 2024-11-29 NOTE — PROGRESS NOTES
Hospitalist Progress Note      Patient:  Esau Fan 74 y.o. male       : 1950  Unit/Bed:[facility]    Date of Admission: 2024      ASSESSMENT AND PLAN    Active Problems  Suspected dysphagia patient is being evaluated by speech therapy  Chest x-ray performed possibly suggestive of aspiration pneumonia chest x-ray done 2024 we will continue current antibiotic regimen cefepime and doxycycline will cover aspirate we will also monitor his oxygen requirements as serum sodium appears to be improving as well with restriction and oral supplementation    LLE cellulitis with medial tissue ulceration   In setting of chronic antibiotic use for chronic RLE osteo, raising concern for more resistant bacteria or Pseudomonas.   ID consulted - continue cefepime, doxycycline.   Has chronic BLE edema - likely has some chronic venous stasis / lymphedema. Venous US in 2024 was negative for DVT. BNP wnl. Will get arterial doppler given medial ulceration.   Improving, continue current ABX per ID.   Hyponatremia   Family reports excessive po fluid intake.   Nephrology consulted - possible SIADH, on trazodone and Risperdal PRN.   Continue fluid restriction. Strict I/Os  Planning to given one time salt tab today.   BMP in AM.   Suspected aspiration pneumonia    Chronic Conditions (reviewed, stable, and home medications resumed, unless otherwise stated)  Chronic RLE osteomyelitis. Follows with Dr. Jackman (ID). Alternates cefadroxil and Bactrim the first of every month, currently on cefadroxil.   Dementia. High risk for hospital acquired delirium. Encourage good sleep/wake cycle. Delirium precautions.   Essential HTN.   H/o tobacco use.   GERD      LDA: []CVC / []PICC / []Midline / []Nath / []Drains / []Mediport / [x]None  Antibiotics: yes  Steroids: no  Labs (still needed?): [x]Yes / []No  IVF (still needed?): []Yes / [x]No    Level of care: []Step Down / [x]Med-Surg  Bed Status: [x]Inpatient /  murmurs.    1+ BLE edema  Abdomen: Soft, non-tender, non-distended with normal bowel sounds.  Musculoskeletal: No joint swelling or tenderness. Normal tone. No abnormal movements.   Skin: Warm and dry. RLE erythema, medial calf ulcer.   Neurologic:  No focal sensory/motor deficits in the upper or lower extremities. Cranial nerves:  grossly non-focal 2-12.     Psychiatric: Arouses to verbal stimuli. Not oriented.   Capillary Refill: Brisk,< 3 seconds.  Peripheral pulses: Posterior tibial pulses present. Pedal pulses difficult to assess.       Labs/Radiology: See chart or assessment above.     Electronically signed by Gumaro Smiley MD on 11/29/2024 at 1:44 PM

## 2024-11-29 NOTE — PLAN OF CARE
Problem: Safety - Adult  Goal: Free from fall injury  Outcome: Not Progressing     Problem: ABCDS Injury Assessment  Goal: Absence of physical injury  Outcome: Not Progressing     Problem: Skin/Tissue Integrity  Goal: Absence of new skin breakdown  Description: 1.  Monitor for areas of redness and/or skin breakdown  2.  Assess vascular access sites hourly  3.  Every 4-6 hours minimum:  Change oxygen saturation probe site  4.  Every 4-6 hours:  If on nasal continuous positive airway pressure, respiratory therapy assess nares and determine need for appliance change or resting period.  Outcome: Not Progressing

## 2024-11-29 NOTE — PROGRESS NOTES
[facility]  SPEECH THERAPY  [facility] 7K  Clinical Swallow Evaluation + Dysphagia tx    Discharge Recommendations: Continue to Assess Pending Progress  DIET ORDER RECOMMENDATIONS AFTER EVALUATION: NPO until completion of MBS  Strategies:  Recommend NPO and Strategies pending MBS completion     SLP Individual Minutes  Time In: 814  Time Out: 830  Minutes: 16  Timed Code Treatment Minutes: 0 Minutes     CSE: 8 minutes  Dysphagia tx: 8 minutes    Date: 2024  Patient Name: Esau Fan      CSN: [id number]   : 1950  (74 y.o.)  Gender: male   Referring Physician:  Iftikhar Baron PA-C     Diagnosis: Cellulitis    History of Present Illness/Injury: Patient admitted to [facility] with above diagnosis: see physician H&P for further information. Per chart review, \"Esau Fan is a 74 y.o. male with a history of chronic right osteomyelitis of the right lower extremity, dementia, hypertension, and GERD who presented to [facility] with chief complaint of redness and swelling of the left lower extremity.  History is provided by the patient's wife as the patient has dementia and is alert and oriented to person only at baseline.  The patient is on antibiotics chronically for a history of chronic osteomyelitis of the right lower extremity.  He alternates Bactrim and cefadroxil.  More recently, he has been alternating agents every month.  For the month of November, he has been on cefadroxil.  He follows with Dr. Jackman at [facility].  Over the last week or so, the patient has had redness and swelling of the left lower extremity.  No fevers or chills.  The patient's wife does report that she did witness their dog licking the left lower extremity after the redness had already been present.  The left lower extremity has been draining some serous appearing fluid.  Additionally, the patient was found to be hyponatremic.  He is not currently on any diuretics.  The patient's wife does report he drinks a  and Symptoms of Laryngeal Penetration/Aspiration: Throat Clear, Immediate Cough, and Delayed Cough    Functional Oral Intake Scale: Total Oral Intake: 5.  Total oral intake of multiple consistencies requiring special preparation    Impressions: Patient presents with mild oral dysphagia with inability to fully discern potential presence of pharyngeal phase deficits without formal instrumentation. Patient with good bolus control/manipulation and timely rotary mastication with effective textural breakdown. Mild lingual residue noted, however, prompted liquid wash successfully cleared residue. Concerns for decreased airway protection or pharyngeal residue given presence of x1 immediate coughing following thin liquid trial, x1 delayed cough, and x2 throat clear following solid trials. Of course, pharyngeal dysfunction and totality of airway invasions cannot be discerned at bedside alone, therefore, recommend patient complete MBS to further assess pharyngeal swallow function.     Recommend patient downgrade to NPO status with exceptions for medications crushed in puree. ST to follow for dysphagia management and completion of MBS.    *post evaluation, patient without respiratory distress upon leaving room; RN Cassie notified re: clinical findings and recommendations from the assessment; verbal receptiveness noted       RECOMMENDATIONS/ASSESSMENT:  Instrumental Evaluation: Modified Barium Swallow (MBS)  Rehabilitation Potential: good    EDUCATION:  Learner: Patient and Family  Education:  Reviewed results and recommendations of this evaluation, Reviewed diet and strategies, Reviewed signs, symptoms and risks of aspiration, Reviewed ST goals and Plan of Care, and Reviewed recommendations for follow-up  Evaluation of Education: Verbalizes understanding    PLAN:  Skilled SLP intervention on acute care 1-3 x per week or until goals met and or patient plateaus in function.  Specific interventions for next session may include:

## 2024-11-30 ENCOUNTER — APPOINTMENT (OUTPATIENT)
Dept: GENERAL RADIOLOGY | Age: 74
DRG: 603 | End: 2024-11-30
Payer: MEDICARE

## 2024-11-30 PROBLEM — E43 SEVERE MALNUTRITION (HCC): Status: ACTIVE | Noted: 2024-11-30

## 2024-11-30 LAB
ANION GAP SERPL CALC-SCNC: 10 MEQ/L (ref 8–16)
BUN SERPL-MCNC: 15 MG/DL (ref 7–22)
CALCIUM SERPL-MCNC: 8.8 MG/DL (ref 8.5–10.5)
CHLORIDE SERPL-SCNC: 99 MEQ/L (ref 98–111)
CO2 SERPL-SCNC: 25 MEQ/L (ref 23–33)
CREAT SERPL-MCNC: 1 MG/DL (ref 0.4–1.2)
GFR SERPL CREATININE-BSD FRML MDRD: 78 ML/MIN/1.73M2
GLUCOSE SERPL-MCNC: 75 MG/DL (ref 70–108)
POTASSIUM SERPL-SCNC: 4.5 MEQ/L (ref 3.5–5.2)
SODIUM SERPL-SCNC: 134 MEQ/L (ref 135–145)

## 2024-11-30 PROCEDURE — 92611 MOTION FLUOROSCOPY/SWALLOW: CPT

## 2024-11-30 PROCEDURE — 2500000003 HC RX 250 WO HCPCS: Performed by: INTERNAL MEDICINE

## 2024-11-30 PROCEDURE — 99232 SBSQ HOSP IP/OBS MODERATE 35: CPT | Performed by: STUDENT IN AN ORGANIZED HEALTH CARE EDUCATION/TRAINING PROGRAM

## 2024-11-30 PROCEDURE — 99232 SBSQ HOSP IP/OBS MODERATE 35: CPT | Performed by: INTERNAL MEDICINE

## 2024-11-30 PROCEDURE — 6360000002 HC RX W HCPCS: Performed by: PHYSICIAN ASSISTANT

## 2024-11-30 PROCEDURE — 1200000000 HC SEMI PRIVATE

## 2024-11-30 PROCEDURE — 2580000003 HC RX 258: Performed by: STUDENT IN AN ORGANIZED HEALTH CARE EDUCATION/TRAINING PROGRAM

## 2024-11-30 PROCEDURE — 6360000002 HC RX W HCPCS: Performed by: STUDENT IN AN ORGANIZED HEALTH CARE EDUCATION/TRAINING PROGRAM

## 2024-11-30 PROCEDURE — 6370000000 HC RX 637 (ALT 250 FOR IP): Performed by: PHYSICIAN ASSISTANT

## 2024-11-30 PROCEDURE — 80048 BASIC METABOLIC PNL TOTAL CA: CPT

## 2024-11-30 PROCEDURE — 36415 COLL VENOUS BLD VENIPUNCTURE: CPT

## 2024-11-30 PROCEDURE — 2580000003 HC RX 258: Performed by: PHYSICIAN ASSISTANT

## 2024-11-30 PROCEDURE — 74230 X-RAY XM SWLNG FUNCJ C+: CPT

## 2024-11-30 PROCEDURE — 92526 ORAL FUNCTION THERAPY: CPT

## 2024-11-30 RX ADMIN — SODIUM CHLORIDE, PRESERVATIVE FREE 5 ML: 5 INJECTION INTRAVENOUS at 20:44

## 2024-11-30 RX ADMIN — ENOXAPARIN SODIUM 40 MG: 100 INJECTION SUBCUTANEOUS at 20:44

## 2024-11-30 RX ADMIN — SODIUM CHLORIDE, PRESERVATIVE FREE 10 ML: 5 INJECTION INTRAVENOUS at 11:01

## 2024-11-30 RX ADMIN — TRAZODONE HYDROCHLORIDE 50 MG: 50 TABLET ORAL at 20:44

## 2024-11-30 RX ADMIN — MEMANTINE 5 MG: 5 TABLET ORAL at 11:03

## 2024-11-30 RX ADMIN — MEMANTINE 5 MG: 5 TABLET ORAL at 20:44

## 2024-11-30 RX ADMIN — HYDRALAZINE HYDROCHLORIDE 12.5 MG: 25 TABLET ORAL at 11:02

## 2024-11-30 RX ADMIN — MIRTAZAPINE 7.5 MG: 7.5 TABLET, FILM COATED ORAL at 20:44

## 2024-11-30 RX ADMIN — CEFEPIME 2000 MG: 2 INJECTION, POWDER, FOR SOLUTION INTRAVENOUS at 14:45

## 2024-11-30 RX ADMIN — BARIUM SULFATE 50 ML: 0.81 POWDER, FOR SUSPENSION ORAL at 09:49

## 2024-11-30 RX ADMIN — BARIUM SULFATE 15 ML: 400 PASTE ORAL at 09:48

## 2024-11-30 RX ADMIN — DOXYCYCLINE HYCLATE 100 MG: 100 TABLET, COATED ORAL at 11:03

## 2024-11-30 RX ADMIN — DOXYCYCLINE HYCLATE 100 MG: 100 TABLET, COATED ORAL at 20:44

## 2024-11-30 NOTE — PROGRESS NOTES
[facility] Infectious Disease         Progress Note      Esau Fan  MEDICAL RECORD NUMBER:  [id number]  AGE: 74 y.o.   GENDER: male  : 1950  EPISODE DATE:  2024      Assessment:     Patient is a 74-year-old male who I am consulted given concern for left lower extremity cellulitis with medial tissue ulceration.     Patient is chronically on therapy with cefadroxil and Bactrim which he receives 1 month courses that alternate, this has been ongoing since .  Generally I do not recommend treatment of chronic osteomyelitis as outcomes data does not show improved mortality with 6 weeks versus longer courses.  I will defer this treatment to the outpatient physician.  With the use of Bactrim and an elderly patient, I would be concerned for adverse renal manifestations.  Patient has most recently been on therapy with cefadroxil.  I would have some suspicion for more resistant gram-negative's as well as Pseudomonas especially given the appearance of the ulceration on the medial surface.     Will defer vascular workup as potential cause of ulceration to primary service.  Discussed with family and will continue to monitor for 1 additional day, reasonable to consider discharge on .     Continue therapy with cefepime and doxycycline   When ready for discharge would recommend transition to ciprofloxacin 500 twice daily and doxycycline 100 twice daily   End of therapy     Consider arterial or venous work up, will defer to primary service   Vascular duplex of arteries ordered  Reviewed x-ray which demonstrates no indwelling hardware      Subjective:     Chief Complaint   Patient presents with    Leg Swelling     \" A long time have checked for DVT hospitalized in May for . Has oesteomylitis right leg under care Dr Jackman\" x 6 months.  Redness and draining clear liquid started week ago Dr Jackman  wants a culture       No acute events overnight.  Patient is currently on therapy with  Arm felt like it was on fire\"       MEDICATIONS    No current facility-administered medications on file prior to encounter.     Current Outpatient Medications on File Prior to Encounter   Medication Sig Dispense Refill    Sulfamethoxazole-Trimethoprim (BACTRIM DS PO) Take by mouth X 1 month  started (on sice 2012)      Cefadroxil (DURICEF PO) Take by mouth Started 11/1/24 for 1 month then switch back to bactrim      memantine (NAMENDA) 5 MG tablet Take 1 tablet by mouth 2 times daily 60 tablet 0    mirtazapine (REMERON) 7.5 MG tablet Take 1 tablet by mouth nightly 30 tablet 0    omeprazole (PRILOSEC) 20 MG delayed release capsule Take 1 capsule by mouth 2 times daily 60 capsule 0    rivastigmine (EXELON) 4.6 MG/24HR Place 1 patch onto the skin daily 30 patch 0    traZODone (DESYREL) 50 MG tablet Take 1 tablet by mouth nightly as needed for Sleep 30 tablet 0    risperiDONE (RISPERDAL) 0.5 MG tablet Take 1 tablet by mouth 2 times daily as needed (as needed for agitation) 20 tablet 0    nicotine (NICODERM CQ) 14 MG/24HR Place 1 patch onto the skin daily (Patient not taking: Reported on 11/26/2024) 30 patch 3    pantoprazole (PROTONIX) 40 MG tablet Take 1 tablet by mouth every morning (before breakfast) 30 tablet 3       REVIEW OF SYSTEMS    Unable to obtain due to mentation      Objective:      BP (!) 147/65   Pulse 61   Temp 99 °F (37.2 °C) (Axillary)   Resp 18   Ht 1.829 m (6')   Wt 71.2 kg (157 lb)   SpO2 94%   BMI 21.29 kg/m²     Wt Readings from Last 3 Encounters:   11/26/24 71.2 kg (157 lb)   08/21/24 93.4 kg (206 lb)   05/10/24 93.4 kg (206 lb)         There is no immunization history on file for this patient.    PHYSICAL EXAM    BP (!) 147/65   Pulse 61   Temp 99 °F (37.2 °C) (Axillary)   Resp 18   Ht 1.829 m (6')   Wt 71.2 kg (157 lb)   SpO2 94%   BMI 21.29 kg/m²   General:  Awake, alert, not in distress.  HEENT: pink conjunctiva, unicteric sclera, moist oral mucosa.  Chest:  bilateral air entry,

## 2024-11-30 NOTE — PLAN OF CARE
Problem: Discharge Planning  Goal: Discharge to home or other facility with appropriate resources  Outcome: Progressing     Problem: Confusion  Goal: Confusion, delirium, dementia, or psychosis is improved or at baseline  Description: INTERVENTIONS:  1. Assess for possible contributors to thought disturbance, including medications, impaired vision or hearing, underlying metabolic abnormalities, dehydration, psychiatric diagnoses, and notify attending LIP  2. [place] high risk fall precautions, as indicated  3. Provide frequent short contacts to provide reality reorientation, refocusing and direction  4. Decrease environmental stimuli, including noise as appropriate  5. Monitor and intervene to maintain adequate nutrition, hydration, elimination, sleep and activity  6. If unable to ensure safety without constant attention obtain sitter and review sitter guidelines with assigned personnel  7. Initiate Psychosocial CNS and Spiritual Care consult, as indicated  Outcome: Progressing     Problem: Safety - Adult  Goal: Free from fall injury  Outcome: Progressing     Problem: ABCDS Injury Assessment  Goal: Absence of physical injury  Outcome: Progressing     Problem: Skin/Tissue Integrity  Goal: Absence of new skin breakdown  Description: 1.  Monitor for areas of redness and/or skin breakdown  2.  Assess vascular access sites hourly  3.  Every 4-6 hours minimum:  Change oxygen saturation probe site  4.  Every 4-6 hours:  If on nasal continuous positive airway pressure, respiratory therapy assess nares and determine need for appliance change or resting period.  Outcome: Progressing     Problem: Pain  Goal: Verbalizes/displays adequate comfort level or baseline comfort level  Outcome: Progressing

## 2024-11-30 NOTE — PROGRESS NOTES
[facility]  SPEECH THERAPY  [facility] 7K  Modified Barium Swallow + Dysphagia Therapy    Discharge Recommendations: Continue to Assess Pending Progress  DIET ORDER RECOMMENDATIONS AFTER EVALUATION: Regular, thin liquids   Strategies:  Full Upright Position, Small Bite/Sip, Pulmonary Monitoring, Alternate Solids and Liquids, Limit Distractions, and Monitor for Fatigue     SLP Individual Minutes  Time In: 0940  Time Out: 1006  Minutes: 26  Timed Code Treatment Minutes: 0 Minutes  MBS:14 minutes   Dysphagia Therapy: 12 minutes      Date: 2024  Patient Name: Esau Fan      CSN: [id number]   : 1950  (74 y.o.)  Gender: male   Referring Physician:   Iftikhar Baron PA-C   Diagnosis: Cellulitis [L03.90]  Swelling of left lower extremity [M79.89]    Precautions: Fall risk   History of Present Illness/Injury: Patient admitted to [facility] with above diagnosis: see physician H&P for further information. Per chart review, \"Esau Fan is a 74 y.o. male with a history of chronic right osteomyelitis of the right lower extremity, dementia, hypertension, and GERD who presented to [facility] with chief complaint of redness and swelling of the left lower extremity.  History is provided by the patient's wife as the patient has dementia and is alert and oriented to person only at baseline.  The patient is on antibiotics chronically for a history of chronic osteomyelitis of the right lower extremity.  He alternates Bactrim and cefadroxil.  More recently, he has been alternating agents every month.  For the month of November, he has been on cefadroxil.  He follows with Dr. Jackman at [facility].  Over the last week or so, the patient has had redness and swelling of the left lower extremity.  No fevers or chills.  The patient's wife does report that she did witness their dog licking the left lower extremity after the redness had already been present.  The left lower extremity has been draining some serous

## 2024-11-30 NOTE — PLAN OF CARE
Problem: Discharge Planning  Goal: Discharge to home or other facility with appropriate resources  11/30/2024 1538 by Obdulia Ramesh LPN  Outcome: Progressing  11/30/2024 0455 by Bong Steven, AMBER  Outcome: Progressing     Problem: Confusion  Goal: Confusion, delirium, dementia, or psychosis is improved or at baseline  Description: INTERVENTIONS:  1. Assess for possible contributors to thought disturbance, including medications, impaired vision or hearing, underlying metabolic abnormalities, dehydration, psychiatric diagnoses, and notify attending LIP  2. [place] high risk fall precautions, as indicated  3. Provide frequent short contacts to provide reality reorientation, refocusing and direction  4. Decrease environmental stimuli, including noise as appropriate  5. Monitor and intervene to maintain adequate nutrition, hydration, elimination, sleep and activity  6. If unable to ensure safety without constant attention obtain sitter and review sitter guidelines with assigned personnel  7. Initiate Psychosocial CNS and Spiritual Care consult, as indicated  11/30/2024 1538 by Obdulia Ramesh LPN  Outcome: Progressing  Flowsheets (Taken 11/30/2024 1538)  Effect of thought disturbance (confusion, delirium, dementia, or psychosis) are managed with adequate functional status:   Assess for contributors to thought disturbance, including medications, impaired vision or hearing, underlying metabolic abnormalities, dehydration, psychiatric diagnoses, notify LIP   [place] high risk fall precautions, as indicated  11/30/2024 0455 by Bong Steven, RN  Outcome: Progressing     Problem: Safety - Adult  Goal: Free from fall injury  11/30/2024 1538 by Obdulia Ramesh LPN  Outcome: Progressing  Flowsheets  Taken 11/30/2024 1538 by Obdulia Ramesh LPN  Free From Fall Injury: Instruct family/caregiver on patient safety  Taken 11/30/2024 1529 by Tea Alvarado RN  Free From Fall Injury: Instruct family/caregiver on patient  supplements  11/30/2024 1122 by Sara Mack, RD, LD  Flowsheets (Taken 11/30/2024 1122)  Nutrient intake appropriate for improving, restoring, or maintaining nutritional needs:   Assess nutritional status and recommend course of action   Monitor oral intake, labs, and treatment plans   Recommend appropriate diets, oral nutritional supplements, and vitamin/mineral supplements

## 2024-11-30 NOTE — PROGRESS NOTES
Hospitalist Progress Note      Patient:  Esau Fan 74 y.o. male       : 1950  Unit/Bed:[facility]    Date of Admission: 2024      ASSESSMENT AND PLAN    Active Problems  Suspected dysphagia patient is being evaluated by speech therapy  Chest x-ray performed possibly suggestive of aspiration pneumonia chest x-ray done 2024 we will continue current antibiotic regimen cefepime and doxycycline will cover aspirate we will also monitor his oxygen requirements as serum sodium appears to be improving as well with restriction and oral supplementation, 2024 no acute decompensation he is accompanied by his wife I have discussed chest x-ray findings continuing oral antibiotic I have also examined his lower extremity which appears to show improvement I would like to monitor the patient's response to oral antibiotics monitor his oxygen requirements prior to considering discharge and ultimately consider waiting on arterial Doppler results of his lower extremity likely to be before Monday    LLE cellulitis with medial tissue ulceration   In setting of chronic antibiotic use for chronic RLE osteo, raising concern for more resistant bacteria or Pseudomonas.   ID consulted - continue cefepime, doxycycline.   Has chronic BLE edema - likely has some chronic venous stasis / lymphedema. Venous US in 2024 was negative for DVT. BNP wnl. Will get arterial doppler given medial ulceration.   Improving, continue current ABX per ID.   Hyponatremia   Family reports excessive po fluid intake.   Nephrology consulted - possible SIADH, on trazodone and Risperdal PRN.   Continue fluid restriction. Strict I/Os  Planning to given one time salt tab today.   BMP in AM.   Suspected aspiration pneumonia    Chronic Conditions (reviewed, stable, and home medications resumed, unless otherwise stated)  Chronic RLE osteomyelitis. Follows with Dr. Jackman (ID). Alternates cefadroxil and Bactrim the first of every month,

## 2024-11-30 NOTE — PROGRESS NOTES
Comprehensive Nutrition Assessment    Type and Reason for Visit:  Initial, Consult (Pt very thin, has hx Alzheimers/dementia; Oral Nutrition Supplements)    Nutrition Recommendations/Plan:   Recommend diet as per SLP.  Continue Ensure Compact TID, Magic Cups TID as pt. Reports acceptance.  Recommend MVI.  Encouraged po, good nutrition at best efforts.  Reviewed current fluid restriction - discussed other options to offer pt. Instead of water, coffee, tea, etc.  Will monitor need for additional nutrition interventions.      Malnutrition Assessment:  Malnutrition Status:  Severe malnutrition (11/30/24 1118)    Context:  Chronic Illness     Findings of the 6 clinical characteristics of malnutrition:  Energy Intake:  75% or less estimated energy requirements for 1 month or longer  Weight Loss:   (difficult to evaluate as UBW fluctuates)     Body Fat Loss:  Severe body fat loss Buccal region   Muscle Mass Loss:   (moderate) Temples (temporalis), Clavicles (pectoralis & deltoids), Thigh (quadriceps)  Fluid Accumulation:  Unable to assess     Strength:  Not Performed    Nutrition Assessment:     Pt. severely malnourished AEB criteria listed above.  At risk for further nutritional compromise r/t admit with cellulitis, hyponatremia and underlying medical condition (hx dementia, GERD, HTN, choking on liquids at times).       Nutrition Related Findings:    Pt. Report/Treatments/Miscellaneous:   11/30-pt. Seen w/ wife and daughter; pt. Very sleepy during visit; wife states pt. Has good appetite - consumes 2 meals/day and will drink an Ensure; admits that pt. Has been drinking a lot of fluids lately (water, coffee, tea, etc); wife reports pt. Will choke on liquids at times; SLP following; s/p MBS; pt. Reports acceptance of Ensures and Magic cups; states does like meats; daughter reports pt. Used to weigh 225# but got sepsis in 2012 and lost a lot of weight; hasn't been able to gain back; wife states UBW fluctuates 140-155#

## 2024-12-01 LAB
ANION GAP SERPL CALC-SCNC: 11 MEQ/L (ref 8–16)
BUN SERPL-MCNC: 21 MG/DL (ref 7–22)
CALCIUM SERPL-MCNC: 8.6 MG/DL (ref 8.5–10.5)
CHLORIDE SERPL-SCNC: 99 MEQ/L (ref 98–111)
CO2 SERPL-SCNC: 24 MEQ/L (ref 23–33)
CREAT SERPL-MCNC: 1 MG/DL (ref 0.4–1.2)
GFR SERPL CREATININE-BSD FRML MDRD: 78 ML/MIN/1.73M2
GLUCOSE SERPL-MCNC: 79 MG/DL (ref 70–108)
POTASSIUM SERPL-SCNC: 4.6 MEQ/L (ref 3.5–5.2)
SODIUM SERPL-SCNC: 134 MEQ/L (ref 135–145)

## 2024-12-01 PROCEDURE — 99232 SBSQ HOSP IP/OBS MODERATE 35: CPT | Performed by: STUDENT IN AN ORGANIZED HEALTH CARE EDUCATION/TRAINING PROGRAM

## 2024-12-01 PROCEDURE — 6360000002 HC RX W HCPCS: Performed by: PHYSICIAN ASSISTANT

## 2024-12-01 PROCEDURE — 99232 SBSQ HOSP IP/OBS MODERATE 35: CPT | Performed by: INTERNAL MEDICINE

## 2024-12-01 PROCEDURE — 2580000003 HC RX 258: Performed by: STUDENT IN AN ORGANIZED HEALTH CARE EDUCATION/TRAINING PROGRAM

## 2024-12-01 PROCEDURE — 80048 BASIC METABOLIC PNL TOTAL CA: CPT

## 2024-12-01 PROCEDURE — 36415 COLL VENOUS BLD VENIPUNCTURE: CPT

## 2024-12-01 PROCEDURE — 1200000000 HC SEMI PRIVATE

## 2024-12-01 PROCEDURE — 2580000003 HC RX 258: Performed by: PHYSICIAN ASSISTANT

## 2024-12-01 PROCEDURE — 6370000000 HC RX 637 (ALT 250 FOR IP): Performed by: PHYSICIAN ASSISTANT

## 2024-12-01 PROCEDURE — 6360000002 HC RX W HCPCS: Performed by: STUDENT IN AN ORGANIZED HEALTH CARE EDUCATION/TRAINING PROGRAM

## 2024-12-01 RX ADMIN — SODIUM CHLORIDE, PRESERVATIVE FREE 10 ML: 5 INJECTION INTRAVENOUS at 09:32

## 2024-12-01 RX ADMIN — DOXYCYCLINE HYCLATE 100 MG: 100 TABLET, COATED ORAL at 21:04

## 2024-12-01 RX ADMIN — SODIUM CHLORIDE, PRESERVATIVE FREE 10 ML: 5 INJECTION INTRAVENOUS at 21:18

## 2024-12-01 RX ADMIN — MIRTAZAPINE 7.5 MG: 7.5 TABLET, FILM COATED ORAL at 21:04

## 2024-12-01 RX ADMIN — CEFEPIME 2000 MG: 2 INJECTION, POWDER, FOR SOLUTION INTRAVENOUS at 02:31

## 2024-12-01 RX ADMIN — DOXYCYCLINE HYCLATE 100 MG: 100 TABLET, COATED ORAL at 09:33

## 2024-12-01 RX ADMIN — HYDRALAZINE HYDROCHLORIDE 12.5 MG: 25 TABLET ORAL at 09:33

## 2024-12-01 RX ADMIN — MEMANTINE 5 MG: 5 TABLET ORAL at 09:32

## 2024-12-01 RX ADMIN — ENOXAPARIN SODIUM 40 MG: 100 INJECTION SUBCUTANEOUS at 21:04

## 2024-12-01 RX ADMIN — CEFEPIME 2000 MG: 2 INJECTION, POWDER, FOR SOLUTION INTRAVENOUS at 14:36

## 2024-12-01 RX ADMIN — MEMANTINE 5 MG: 5 TABLET ORAL at 21:04

## 2024-12-01 RX ADMIN — PANTOPRAZOLE SODIUM 40 MG: 40 TABLET, DELAYED RELEASE ORAL at 06:38

## 2024-12-01 ASSESSMENT — PAIN SCALES - GENERAL
PAINLEVEL_OUTOF10: 0

## 2024-12-01 NOTE — PROGRESS NOTES
Hospitalist Progress Note      Patient:  Esau Fan 74 y.o. male       : 1950  Unit/Bed:[facility]009-A    Date of Admission: 2024      ASSESSMENT AND PLAN    Active Problems  Suspected dysphagia patient is being evaluated by speech therapy  Chest x-ray performed possibly suggestive of aspiration pneumonia chest x-ray done 2024 we will continue current antibiotic regimen cefepime and doxycycline will cover aspirate we will also monitor his oxygen requirements as serum sodium appears to be improving as well with restriction and oral supplementation, 2024 no acute decompensation he is accompanied by his daughter I have discussed chest x-ray findings continuing oral antibiotic I have also examined his lower extremity which appears to show improvement I would like to monitor the patient's response to oral antibiotics monitor his oxygen requirements prior to considering discharge and ultimately consider waiting on arterial Doppler results of his lower extremity likely to be before Monday, if arterial Dopplers performed before 1300 hrs.  I will consider discharge to home on oral antibiotics have discussed this plan with his daughter 2024    LLE cellulitis with medial tissue ulceration   In setting of chronic antibiotic use for chronic RLE osteo, raising concern for more resistant bacteria or Pseudomonas.   ID consulted - continue cefepime, doxycycline.   Has chronic BLE edema - likely has some chronic venous stasis / lymphedema. Venous US in 2024 was negative for DVT. BNP wnl. Will get arterial doppler given medial ulceration.   Improving, continue current ABX per ID.   Hyponatremia   Family reports excessive po fluid intake.   Nephrology consulted - possible SIADH, on trazodone and Risperdal PRN.   Continue fluid restriction. Strict I/Os  Planning to given one time salt tab today.   BMP in AM.   Suspected aspiration pneumonia    Chronic Conditions (reviewed, stable, and  home medications resumed, unless otherwise stated)  Chronic RLE osteomyelitis. Follows with Dr. Jackman (ID). Alternates cefadroxil and Bactrim the first of every month, currently on cefadroxil.   Dementia. High risk for hospital acquired delirium. Encourage good sleep/wake cycle. Delirium precautions.   Essential HTN.   H/o tobacco use.   GERD      LDA: []CVC / []PICC / []Midline / []Nath / []Drains / []Mediport / [x]None  Antibiotics: yes  Steroids: no  Labs (still needed?): [x]Yes / []No  IVF (still needed?): []Yes / [x]No    Level of care: []Step Down / [x]Med-Surg  Bed Status: [x]Inpatient / []Observation  Telemetry: [x]Yes / []No  PT/OT: [x]Yes / []No    DVT Prophylaxis: [x] Lovenox / [] Heparin / [] SCDs / [] Already on Systemic Anticoagulation / [] None     Expected discharge date:  1-2 days  Disposition: Home with wife. Bedside commode ordered.      Code status: Full Code     ===================================================================    Chief Complaint: Redness and swelling of the left lower extremity   Subjective (past 24 hours):   11/28: Patient resting comfortably. Per family, patient typically sleeps until the afternoon. Has been at baseline mentation. No acute complaints when up. Swelling in BLE is improving. Continued LLE erythema. Discussed with patient's wife and daughter at bedside.     11/27: Patient resting comfortably. Arouses easily to verbal stimuli. History comes from wife at bedside. Wife reports patient drinks excessive amount of fluids, little solid intake. She reports it is common for him to sleep often through out the day. Wife reports chronic RLE osteomyelitis, follows with ID at [facility].  Wife states patient has been dealing with BLE swelling for months. Is not on diuretics. Consultant notes reviewed.       Initial HPI 11/26/2024 per chart review:  Esau Fan is a 74 y.o. male with a history of chronic right osteomyelitis of the right lower extremity, dementia, hypertension,

## 2024-12-01 NOTE — PLAN OF CARE
Problem: Discharge Planning  Goal: Discharge to home or other facility with appropriate resources  11/30/2024 2238 by Chelsi Luis, RN  Outcome: Progressing  Flowsheets (Taken 11/30/2024 2238)  Discharge to home or other facility with appropriate resources:   Arrange for needed discharge resources and transportation as appropriate   Identify barriers to discharge with patient and caregiver   Identify discharge learning needs (meds, wound care, etc)  11/30/2024 1538 by Obdulia Ramesh LPN  Outcome: Progressing     Problem: Confusion  Goal: Confusion, delirium, dementia, or psychosis is improved or at baseline  Description: INTERVENTIONS:  1. Assess for possible contributors to thought disturbance, including medications, impaired vision or hearing, underlying metabolic abnormalities, dehydration, psychiatric diagnoses, and notify attending LIP  2. [place] high risk fall precautions, as indicated  3. Provide frequent short contacts to provide reality reorientation, refocusing and direction  4. Decrease environmental stimuli, including noise as appropriate  5. Monitor and intervene to maintain adequate nutrition, hydration, elimination, sleep and activity  6. If unable to ensure safety without constant attention obtain sitter and review sitter guidelines with assigned personnel  7. Initiate Psychosocial CNS and Spiritual Care consult, as indicated  11/30/2024 2238 by Chelsi Luis, RN  Outcome: Progressing  Flowsheets (Taken 11/30/2024 2238)  Effect of thought disturbance (confusion, delirium, dementia, or psychosis) are managed with adequate functional status:   [place] high risk fall precautions, as indicated   Assess for contributors to thought disturbance, including medications, impaired vision or hearing, underlying metabolic abnormalities, dehydration, psychiatric diagnoses, notify LIP   Provide frequent short contacts to provide reality reorientation, refocusing and direction   Decrease environmental  supplements, and vitamin/mineral supplements  11/30/2024 1122 by Sara Mack, RD, LD  Flowsheets (Taken 11/30/2024 1122)  Nutrient intake appropriate for improving, restoring, or maintaining nutritional needs:   Assess nutritional status and recommend course of action   Monitor oral intake, labs, and treatment plans   Recommend appropriate diets, oral nutritional supplements, and vitamin/mineral supplements   Care plan reviewed with patient and wife.  Wife verbalize understanding of the plan of care and contribute to goal setting.

## 2024-12-01 NOTE — PROGRESS NOTES
[facility]   Nephrology progress note  12/1/2024, 12:55 PM      Pt Name:    Esau Fan  MRN:     [id number]     YOB: 1950  Admit Date:    11/26/2024  2:00 PM    Chief Complaint: Nephrology following for hyponatremia    Subjective:  Patient was seen and examined this morning  On room air  Family member at the bedside    Objective:  24HR INTAKE/OUTPUT:    Intake/Output Summary (Last 24 hours) at 12/1/2024 1255  Last data filed at 12/1/2024 1200  Gross per 24 hour   Intake 1800 ml   Output 2050 ml   Net -250 ml         I/O last 3 completed shifts:  In: 1800 [P.O.:1800]  Out: 2300 [Urine:2300]  I/O this shift:  In: -   Out: 850 [Urine:850]   Admission weight: 71.2 kg (157 lb)  Wt Readings from Last 3 Encounters:   12/01/24 69.7 kg (153 lb 10.6 oz)   08/21/24 93.4 kg (206 lb)   05/10/24 93.4 kg (206 lb)        Vitals :   Vitals:    12/01/24 0230 12/01/24 0600 12/01/24 0920 12/01/24 1140   BP: 139/60  119/78 128/66   Pulse: 58  75 71   Resp: 16  16 16   Temp: 97.3 °F (36.3 °C)  97.2 °F (36.2 °C) 98.4 °F (36.9 °C)   TempSrc: Axillary  Oral Oral   SpO2: 96%  94% 97%   Weight:  69.7 kg (153 lb 10.6 oz)     Height:           Physical examination  General Appearance: Frail, cachectic appearance.  Muscle wasting  Mouth/Throat: Oral mucosa dry  Neck: No JVD  Lungs: Air entry B/L, no rales, no use of accessory muscles  Heart:  S1, S2 heard  GI: soft, non-tender, no guarding    Medications:  Infusion:    sodium chloride       Meds:    cefepime  2,000 mg IntraVENous Q12H    sodium chloride flush  5-40 mL IntraVENous 2 times per day    enoxaparin  40 mg SubCUTAneous Q24H    doxycycline hyclate  100 mg Oral 2 times per day    memantine  5 mg Oral BID    mirtazapine  7.5 mg Oral Nightly    pantoprazole  40 mg Oral QAM AC    rivastigmine  1 patch TransDERmal Daily    hydrALAZINE  12.5 mg Oral Daily     Meds prn: sodium chloride flush, sodium chloride, potassium chloride **OR** potassium

## 2024-12-02 ENCOUNTER — APPOINTMENT (OUTPATIENT)
Dept: INTERVENTIONAL RADIOLOGY/VASCULAR | Age: 74
DRG: 603 | End: 2024-12-02
Payer: MEDICARE

## 2024-12-02 LAB
ANION GAP SERPL CALC-SCNC: 7 MEQ/L (ref 8–16)
BUN SERPL-MCNC: 22 MG/DL (ref 7–22)
CALCIUM SERPL-MCNC: 8.9 MG/DL (ref 8.5–10.5)
CHLORIDE SERPL-SCNC: 99 MEQ/L (ref 98–111)
CO2 SERPL-SCNC: 26 MEQ/L (ref 23–33)
CREAT SERPL-MCNC: 1.1 MG/DL (ref 0.4–1.2)
ECHO BSA: 1.9 M2
GFR SERPL CREATININE-BSD FRML MDRD: 69 ML/MIN/1.73M2
GLUCOSE SERPL-MCNC: 86 MG/DL (ref 70–108)
POTASSIUM SERPL-SCNC: 4.5 MEQ/L (ref 3.5–5.2)
SODIUM SERPL-SCNC: 132 MEQ/L (ref 135–145)

## 2024-12-02 PROCEDURE — 6360000002 HC RX W HCPCS: Performed by: PHYSICIAN ASSISTANT

## 2024-12-02 PROCEDURE — 97161 PT EVAL LOW COMPLEX 20 MIN: CPT

## 2024-12-02 PROCEDURE — 99232 SBSQ HOSP IP/OBS MODERATE 35: CPT | Performed by: INTERNAL MEDICINE

## 2024-12-02 PROCEDURE — 36415 COLL VENOUS BLD VENIPUNCTURE: CPT

## 2024-12-02 PROCEDURE — 6370000000 HC RX 637 (ALT 250 FOR IP): Performed by: PHYSICIAN ASSISTANT

## 2024-12-02 PROCEDURE — 6360000002 HC RX W HCPCS: Performed by: STUDENT IN AN ORGANIZED HEALTH CARE EDUCATION/TRAINING PROGRAM

## 2024-12-02 PROCEDURE — 1200000000 HC SEMI PRIVATE

## 2024-12-02 PROCEDURE — 2580000003 HC RX 258: Performed by: STUDENT IN AN ORGANIZED HEALTH CARE EDUCATION/TRAINING PROGRAM

## 2024-12-02 PROCEDURE — 2580000003 HC RX 258: Performed by: PHYSICIAN ASSISTANT

## 2024-12-02 PROCEDURE — 97116 GAIT TRAINING THERAPY: CPT

## 2024-12-02 PROCEDURE — 99232 SBSQ HOSP IP/OBS MODERATE 35: CPT | Performed by: STUDENT IN AN ORGANIZED HEALTH CARE EDUCATION/TRAINING PROGRAM

## 2024-12-02 PROCEDURE — 93925 LOWER EXTREMITY STUDY: CPT

## 2024-12-02 PROCEDURE — 80048 BASIC METABOLIC PNL TOTAL CA: CPT

## 2024-12-02 RX ADMIN — DOXYCYCLINE HYCLATE 100 MG: 100 TABLET, COATED ORAL at 19:48

## 2024-12-02 RX ADMIN — SODIUM CHLORIDE, PRESERVATIVE FREE 10 ML: 5 INJECTION INTRAVENOUS at 19:48

## 2024-12-02 RX ADMIN — CEFEPIME 2000 MG: 2 INJECTION, POWDER, FOR SOLUTION INTRAVENOUS at 16:05

## 2024-12-02 RX ADMIN — RISPERIDONE 0.5 MG: 0.25 TABLET, FILM COATED ORAL at 18:55

## 2024-12-02 RX ADMIN — MEMANTINE 5 MG: 5 TABLET ORAL at 19:48

## 2024-12-02 RX ADMIN — MIRTAZAPINE 7.5 MG: 7.5 TABLET, FILM COATED ORAL at 19:48

## 2024-12-02 RX ADMIN — MEMANTINE 5 MG: 5 TABLET ORAL at 08:02

## 2024-12-02 RX ADMIN — ENOXAPARIN SODIUM 40 MG: 100 INJECTION SUBCUTANEOUS at 19:48

## 2024-12-02 RX ADMIN — SODIUM CHLORIDE, PRESERVATIVE FREE 10 ML: 5 INJECTION INTRAVENOUS at 08:03

## 2024-12-02 RX ADMIN — HYDRALAZINE HYDROCHLORIDE 12.5 MG: 25 TABLET ORAL at 08:01

## 2024-12-02 RX ADMIN — DOXYCYCLINE HYCLATE 100 MG: 100 TABLET, COATED ORAL at 08:02

## 2024-12-02 RX ADMIN — CEFEPIME 2000 MG: 2 INJECTION, POWDER, FOR SOLUTION INTRAVENOUS at 03:11

## 2024-12-02 ASSESSMENT — PAIN SCALES - GENERAL
PAINLEVEL_OUTOF10: 0
PAINLEVEL_OUTOF10: 0

## 2024-12-02 NOTE — PROGRESS NOTES
Hospitalist Progress Note      Patient:  Esau Fan 74 y.o. male       : 1950  Unit/Bed:[facility]-A    Date of Admission: 2024      ASSESSMENT AND PLAN, anticipate discharge pending lower extremity arterial Dopplers if they do not show any significant stenosis or lack of arterial flow then I will consider discharge in a.m. to home with wife 12/3/2024 on oral antibiotics until 2024, patient seen ambulating with a walker with minimal assist from PT OT    Active Problems  Suspected dysphagia patient is being evaluated by speech therapy  Chest x-ray performed possibly suggestive of aspiration pneumonia chest x-ray done 2024 we will continue current antibiotic regimen cefepime and doxycycline will cover aspirate we will also monitor his oxygen requirements as serum sodium appears to be improving as well with restriction and oral supplementation, 2024 no acute decompensation he is accompanied by his daughter I have discussed chest x-ray findings continuing oral antibiotic I have also examined his lower extremity which appears to show improvement I would like to monitor the patient's response to oral antibiotics monitor his oxygen requirements prior to considering discharge and ultimately consider waiting on arterial Doppler results of his lower extremity likely to be before Monday, if arterial Dopplers performed before 1300 hrs.  I will consider discharge to home on oral antibiotics have discussed this plan with his daughter 2024    LLE cellulitis with medial tissue ulceration   In setting of chronic antibiotic use for chronic RLE osteo, raising concern for more resistant bacteria or Pseudomonas.   ID consulted - continue cefepime, doxycycline.   Has chronic BLE edema - likely has some chronic venous stasis / lymphedema. Venous US in 2024 was negative for DVT. BNP wnl. Will get arterial doppler given medial ulceration.   Improving, continue current ABX per ID.  ID at [facility].  Wife states patient has been dealing with BLE swelling for months. Is not on diuretics. Consultant notes reviewed.       Initial HPI 11/26/2024 per chart review:  Esau Fan is a 74 y.o. male with a history of chronic right osteomyelitis of the right lower extremity, dementia, hypertension, and GERD who presented to [facility] with chief complaint of redness and swelling of the left lower extremity.  History is provided by the patient's wife as the patient has dementia and is alert and oriented to person only at baseline.  The patient is on antibiotics chronically for a history of chronic osteomyelitis of the right lower extremity.  He alternates Bactrim and cefadroxil.  More recently, he has been alternating agents every month.  For the month of November, he has been on cefadroxil.  He follows with Dr. Jackman at [facility].  Over the last week or so, the patient has had redness and swelling of the left lower extremity.  No fevers or chills.  The patient's wife does report that she did witness their dog licking the left lower extremity after the redness had already been present.  The left lower extremity has been draining some serous appearing fluid.  Additionally, the patient was found to be hyponatremic.  He is not currently on any diuretics.  The patient's wife does report he drinks a significant amount of fluids on a daily basis and eats very little.  No current smoking or alcohol use.  She does report he quit smoking approximately 1 month ago.     Medications:    Infusion Medications    sodium chloride      Scheduled Medications    cefepime  2,000 mg IntraVENous Q12H    sodium chloride flush  5-40 mL IntraVENous 2 times per day    enoxaparin  40 mg SubCUTAneous Q24H    doxycycline hyclate  100 mg Oral 2 times per day    memantine  5 mg Oral BID    mirtazapine  7.5 mg Oral Nightly    pantoprazole  40 mg Oral QAM AC    rivastigmine  1 patch TransDERmal Daily    hydrALAZINE  12.5 mg Oral Daily    PRN Meds:

## 2024-12-02 NOTE — PROGRESS NOTES
[facility]  INPATIENT PHYSICAL THERAPY  EVALUATION  [facility] 7K - 7K-09/009-A    Discharge Recommendations: Continue to assess pending progress, 24 hour supervision or assist  Equipment Recommendations: No               Time In: 1035  Time Out: 1055  Timed Code Treatment Minutes: 11 Minutes  Minutes: 20          Date: 2024  Patient Name: Esau Fan,  Gender:  male        MRN: [id number]  : 1950  (74 y.o.)      Referring Practitioner: SEBASTIAN Brown  Diagnosis: Cellulitis  Additional Pertinent Hx: Esau Fan is a 74 y.o. male with a history of chronic right osteomyelitis of the right lower extremity, dementia, hypertension, and GERD who presented to [facility] with chief complaint of redness and swelling of the left lower extremity.  History is provided by the patient's wife as the patient has dementia and is alert and oriented to person only at baseline.  The patient is on antibiotics chronically for a history of chronic osteomyelitis of the right lower extremity.  He alternates Bactrim and cefadroxil.  More recently, he has been alternating agents every month.  For the month of November, he has been on cefadroxil.  He follows with Dr. Jackman at [facility].  Over the last week or so, the patient has had redness and swelling of the left lower extremity.  No fevers or chills.  The patient's wife does report that she did witness their dog licking the left lower extremity after the redness had already been present.  The left lower extremity has been draining some serous appearing fluid.  Additionally, the patient was found to be hyponatremic.  He is not currently on any diuretics.  The patient's wife does report he drinks a significant amount of fluids on a daily basis and eats very little.  No current smoking or alcohol use.  She does report he quit smoking approximately 1 month ago.     Restrictions/Precautions:  Restrictions/Precautions: Fall Risk                   Subjective:  Chart

## 2024-12-02 NOTE — PROGRESS NOTES
[facility]   Nephrology progress note  12/2/2024, 9:19 AM      Pt Name:    Esau Fan  MRN:     [id number]     YOB: 1950  Admit Date:    11/26/2024  2:00 PM    Chief Complaint: Nephrology following for hyponatremia    Subjective:  Patient was seen and examined this morning  Family member at bedside.   Patient asleep.   No overnight events.    Objective:  24HR INTAKE/OUTPUT:    Intake/Output Summary (Last 24 hours) at 12/2/2024 0919  Last data filed at 12/2/2024 0827  Gross per 24 hour   Intake 2081.36 ml   Output 1925 ml   Net 156.36 ml         I/O last 3 completed shifts:  In: 3041.4 [P.O.:2680; IV Piggyback:361.4]  Out: 2400 [Urine:2400]  I/O this shift:  In: -   Out: 425 [Urine:425]   Admission weight: 71.2 kg (157 lb)  Wt Readings from Last 3 Encounters:   12/01/24 69.7 kg (153 lb 10.6 oz)   08/21/24 93.4 kg (206 lb)   05/10/24 93.4 kg (206 lb)        Vitals :   Vitals:    12/01/24 2056 12/01/24 2313 12/02/24 0312 12/02/24 0730   BP: 122/66 135/63 (!) 130/59 137/63   Pulse: 58 65 56 52   Resp: 16 18 20 18   Temp: 98.4 °F (36.9 °C) 98.4 °F (36.9 °C) 97.9 °F (36.6 °C) 97.5 °F (36.4 °C)   TempSrc: Oral Axillary Axillary Axillary   SpO2: 97% 95% 94% 96%   Weight:       Height:           Physical examination  General Appearance: Frail, cachectic appearance.  Muscle wasting  Mouth/Throat: Oral mucosa dry  Neck: No JVD  Lungs: Air entry B/L, no rales, no use of accessory muscles  Heart:  S1, S2 heard  GI: soft, non-tender, no guarding  Ext: mild left leg edema    Medications:  Infusion:    sodium chloride       Meds:    cefepime  2,000 mg IntraVENous Q12H    sodium chloride flush  5-40 mL IntraVENous 2 times per day    enoxaparin  40 mg SubCUTAneous Q24H    doxycycline hyclate  100 mg Oral 2 times per day    memantine  5 mg Oral BID    mirtazapine  7.5 mg Oral Nightly    pantoprazole  40 mg Oral QAM AC    rivastigmine  1 patch TransDERmal Daily    hydrALAZINE  12.5 mg Oral Daily

## 2024-12-02 NOTE — CARE COORDINATION
12/2/24, 3:31 PM EST    DISCHARGE ON GOING EVALUATION    Esau Fan       Hospital day: 6  Location: [facility]09/009-A Reason for admit: Cellulitis [L03.90]  Swelling of left lower extremity [M79.89]     Procedures:   11/29 MBS: normal    Imaging since last note:   12/2 BLE dopplers: done, results pending.     Barriers to Discharge: Hospitalist following doppler results. Anticipate dc tomorrow if normal. PO abx at discharge.     PCP: Jhonny Sharif MD  Readmission Risk Score: 12.5    Patient Goals/Plan/Treatment Preferences: Return home with wife. BSC ordere and delivered. Declines HH. Ambulated with therapy today.

## 2024-12-03 VITALS
HEIGHT: 72 IN | TEMPERATURE: 97.8 F | HEART RATE: 66 BPM | SYSTOLIC BLOOD PRESSURE: 146 MMHG | RESPIRATION RATE: 18 BRPM | BODY MASS INDEX: 21.17 KG/M2 | WEIGHT: 156.31 LBS | OXYGEN SATURATION: 97 % | DIASTOLIC BLOOD PRESSURE: 69 MMHG

## 2024-12-03 LAB
ANION GAP SERPL CALC-SCNC: 11 MEQ/L (ref 8–16)
BUN SERPL-MCNC: 25 MG/DL (ref 7–22)
CALCIUM SERPL-MCNC: 8.9 MG/DL (ref 8.5–10.5)
CHLORIDE SERPL-SCNC: 101 MEQ/L (ref 98–111)
CO2 SERPL-SCNC: 22 MEQ/L (ref 23–33)
CREAT SERPL-MCNC: 1 MG/DL (ref 0.4–1.2)
GFR SERPL CREATININE-BSD FRML MDRD: 78 ML/MIN/1.73M2
GLUCOSE SERPL-MCNC: 90 MG/DL (ref 70–108)
POTASSIUM SERPL-SCNC: 4.7 MEQ/L (ref 3.5–5.2)
SODIUM SERPL-SCNC: 134 MEQ/L (ref 135–145)

## 2024-12-03 PROCEDURE — 97535 SELF CARE MNGMENT TRAINING: CPT

## 2024-12-03 PROCEDURE — 99232 SBSQ HOSP IP/OBS MODERATE 35: CPT | Performed by: INTERNAL MEDICINE

## 2024-12-03 PROCEDURE — 36415 COLL VENOUS BLD VENIPUNCTURE: CPT

## 2024-12-03 PROCEDURE — 99239 HOSP IP/OBS DSCHRG MGMT >30: CPT | Performed by: INTERNAL MEDICINE

## 2024-12-03 PROCEDURE — 6370000000 HC RX 637 (ALT 250 FOR IP): Performed by: PHYSICIAN ASSISTANT

## 2024-12-03 PROCEDURE — 6360000002 HC RX W HCPCS: Performed by: STUDENT IN AN ORGANIZED HEALTH CARE EDUCATION/TRAINING PROGRAM

## 2024-12-03 PROCEDURE — 99232 SBSQ HOSP IP/OBS MODERATE 35: CPT | Performed by: STUDENT IN AN ORGANIZED HEALTH CARE EDUCATION/TRAINING PROGRAM

## 2024-12-03 PROCEDURE — 80048 BASIC METABOLIC PNL TOTAL CA: CPT

## 2024-12-03 PROCEDURE — 2580000003 HC RX 258: Performed by: STUDENT IN AN ORGANIZED HEALTH CARE EDUCATION/TRAINING PROGRAM

## 2024-12-03 PROCEDURE — 97530 THERAPEUTIC ACTIVITIES: CPT

## 2024-12-03 PROCEDURE — 2580000003 HC RX 258: Performed by: PHYSICIAN ASSISTANT

## 2024-12-03 RX ORDER — CIPROFLOXACIN 500 MG/1
500 TABLET, FILM COATED ORAL 2 TIMES DAILY
Qty: 6 TABLET | Refills: 0 | Status: SHIPPED | OUTPATIENT
Start: 2024-12-03 | End: 2024-12-06

## 2024-12-03 RX ORDER — HYDRALAZINE HYDROCHLORIDE 25 MG/1
12.5 TABLET, FILM COATED ORAL DAILY
Qty: 90 TABLET | Refills: 3 | Status: SHIPPED | OUTPATIENT
Start: 2024-12-04

## 2024-12-03 RX ORDER — DOXYCYCLINE HYCLATE 100 MG
100 TABLET ORAL EVERY 12 HOURS SCHEDULED
Qty: 2 TABLET | Refills: 0 | Status: SHIPPED | OUTPATIENT
Start: 2024-12-03 | End: 2024-12-04

## 2024-12-03 RX ADMIN — MEMANTINE 5 MG: 5 TABLET ORAL at 09:32

## 2024-12-03 RX ADMIN — SODIUM CHLORIDE, PRESERVATIVE FREE 10 ML: 5 INJECTION INTRAVENOUS at 09:31

## 2024-12-03 RX ADMIN — CEFEPIME 2000 MG: 2 INJECTION, POWDER, FOR SOLUTION INTRAVENOUS at 01:49

## 2024-12-03 RX ADMIN — DOXYCYCLINE HYCLATE 100 MG: 100 TABLET, COATED ORAL at 09:32

## 2024-12-03 RX ADMIN — PANTOPRAZOLE SODIUM 40 MG: 40 TABLET, DELAYED RELEASE ORAL at 06:26

## 2024-12-03 RX ADMIN — HYDRALAZINE HYDROCHLORIDE 12.5 MG: 25 TABLET ORAL at 09:32

## 2024-12-03 ASSESSMENT — PAIN SCALES - GENERAL: PAINLEVEL_OUTOF10: 0

## 2024-12-03 NOTE — DISCHARGE SUMMARY
Discharge Summary    Patient:  Esau Fan  YOB: 1950    MRN: [id number]   Acct: [id number]    Primary Care Physician: Jhonny Sharif MD    Admit date:  11/26/2024    Discharge date:   12/30/2024      Discharge Diagnoses:   Cellulitis  Principal Problem:    Cellulitis  Active Problems:    Ulcer of left medial lower extremity, limited to breakdown of skin (HCC)    Swelling of left lower extremity    Moderate Alzheimer's dementia (HCC)    Physical deconditioning    Chronic osteomyelitis    Stage 2 chronic kidney disease    Hyponatremia    Severe malnutrition (HCC)  Resolved Problems:    * No resolved hospital problems. *        Admitted for: (HPI) lower extremity cellulitis    Hospital Course: 74 y.o. male with a history of chronic right osteomyelitis of the right lower extremity, dementia, hypertension, and GERD who presented to [facility] with chief complaint of redness and swelling of the left lower extremity.  History is provided by the patient's wife as the patient has dementia and is alert and oriented to person only at baseline.  The patient is on antibiotics chronically for a history of chronic osteomyelitis of the right lower extremity.  He alternates Bactrim and cefadroxil.  More recently, he has been alternating agents every month.  For the month of November, he has been on cefadroxil.  He follows with Dr. Jackman at [facility].  Over the last week or so, the patient has had redness and swelling of the left lower extremity.  No fevers or chills.  The patient's wife does report that she did witness their dog licking the left lower extremity after the redness had already been present.  The left lower extremity has been draining some serous appearing fluid.  Additionally, the patient was found to be hyponatremic.  He is not currently on any diuretics.  The patient's wife does report he drinks a significant amount of fluids on a daily basis and eats very little.  No current smoking or alcohol  thickened cusps. Mildly calcified cusps.   Image quality is adequate.     XR CHEST PORTABLE    Result Date: 11/29/2024  PROCEDURE: XR CHEST PORTABLE CLINICAL INFORMATION: please evaluate for aspiration inflammatory changes in right lower lobe COMPARISON: August 21, 2024 TECHNIQUE: A single mobile view of the chest was obtained.     1. Mild cardiomegaly. Moderate size hiatus hernia. 2. Tiny bilateral pleural effusions. Moderate interstitial pneumonia/edema involving both mid and lower lung fields, worse on the right. Superimposed interstitial fibrosis in either lung cannot be excluded. 3. Overall appearance of chest has worsened since prior study. **This report has been created using voice recognition software.  It may contain minor errors which are inherent in voice recognition technology.** Electronically signed by Dr. Asael Miranda    XR TIBIA FIBULA RIGHT (2 VIEWS)    Result Date: 11/27/2024  PROCEDURE: XR TIBIA FIBULA RIGHT (2 VIEWS) CLINICAL INFORMATION: history chronic osteomyelitis. COMPARISON: No prior study. TECHNIQUE: 4 views of the right leg FINDINGS: There is deformity of the midshaft of the right tibia which could be secondary to old osteomyelitis. There is mild diffuse osteopenia. There are degenerative changes. There is no fracture. There is vascular calcification..   There are areas of instrumentation in the distal tibia..     1. Deformity of the tibia consistent with chronic osteomyelitis. Evidence of previous instrumentation in the distal tibia.. **This report has been created using voice recognition software. It may contain minor errors which are inherent in voice recognition technology.** Electronically signed by Dr. Sanjay Contreras    Vascular duplex lower extremity venous left    Result Date: 11/26/2024  Left lower extremity venous duplex ultrasound Comparison: 8/21/24 Findings: The visualized deep veins are fully compressible with normal flow. No popliteal cyst.     Impression: No deep vein  Monocytes % 8.1 %    Eosinophils 3.4 %    Basophils 0.3 %    Immature Granulocytes % 0.5 %    Neutrophils Absolute 4.3 1.8 - 7.7 thou/mm3    Lymphocytes Absolute 1.4 1.0 - 4.8 thou/mm3    Monocytes Absolute 0.5 0.4 - 1.3 thou/mm3    Eosinophils Absolute 0.2 0.0 - 0.4 thou/mm3    Basophils Absolute 0.0 0.0 - 0.1 thou/mm3    Immature Grans (Abs) 0.03 0.00 - 0.07 thou/mm3    nRBC 0 /100 wbc   Sodium   Result Value Ref Range    Sodium 131 (L) 135 - 145 meq/L   Potassium   Result Value Ref Range    Potassium 4.7 3.5 - 5.2 meq/L   TSH with Reflex   Result Value Ref Range    TSH 1.910 0.400 - 4.200 uIU/mL   Sodium, urine, random   Result Value Ref Range    Sodium, Ur 93 meq/l   Osmolality, urine   Result Value Ref Range    Osmolality, Ur see below 250 - 750 mosmol/kg   Sodium   Result Value Ref Range    Sodium 131 (L) 135 - 145 meq/L   Reference lab sample   Result Value Ref Range    REFERENCE LOCATION see below     TEST(S) BEING PERFORMED Urine Osmo     TEST RESULTS WITH UNITS 356 mOsm     Reference Range  mOsm     ORIGINAL SAMPLE NUMBER [id number]    Sodium   Result Value Ref Range    Sodium 130 (L) 135 - 145 meq/L   Anion Gap   Result Value Ref Range    Anion Gap 9.0 8.0 - 16.0 meq/L   Glomerular Filtration Rate, Estimated   Result Value Ref Range    Est, Glom Filt Rate > 90 >60 ml/min/1.73m2   Osmolality   Result Value Ref Range    Osmolality Calc 260.2 (L) 275.0 - 300.0 mOsmol/kg   Basic Metabolic Panel   Result Value Ref Range    Sodium 130 (L) 135 - 145 meq/L    Potassium 4.6 3.5 - 5.2 meq/L    Chloride 96 (L) 98 - 111 meq/L    CO2 26 23 - 33 meq/L    Glucose 88 70 - 108 mg/dL    BUN 18 7 - 22 mg/dL    Creatinine 1.1 0.4 - 1.2 mg/dL    Calcium 8.9 8.5 - 10.5 mg/dL   Anion Gap   Result Value Ref Range    Anion Gap 8.0 8.0 - 16.0 meq/L   Glomerular Filtration Rate, Estimated   Result Value Ref Range    Est, Glom Filt Rate 69 >60 ml/min/1.73m2   Basic Metabolic Panel   Result Value Ref Range    Sodium 132 (L) 135

## 2024-12-03 NOTE — PROGRESS NOTES
[facility] Infectious Disease         Progress Note      Easu Fan  MEDICAL RECORD NUMBER:  [id number]  AGE: 74 y.o.   GENDER: male  : 1950  EPISODE DATE:  2024      Assessment:     Patient is a 74-year-old male who I am consulted given concern for left lower extremity cellulitis with medial tissue ulceration.     Patient is chronically on therapy with cefadroxil and Bactrim which he receives 1 month courses that alternate, this has been ongoing since .  Generally I do not recommend treatment of chronic osteomyelitis as outcomes data does not show improved mortality with 6 weeks versus longer courses.  I will defer this treatment to the outpatient physician.  With the use of Bactrim and an elderly patient, I would be concerned for adverse renal manifestations.  Patient has most recently been on therapy with cefadroxil.  I would have some suspicion for more resistant gram-negative's as well as Pseudomonas especially given the appearance of the ulceration on the medial surface.     Will defer vascular workup as potential cause of ulceration to primary service.  No further infectious disease workup at this time.  Wounds have shown significant improvement.       Continue therapy with cefepime and doxycycline   When ready for discharge would recommend transition to ciprofloxacin 500 twice daily and doxycycline 100 twice daily   End of therapy       Subjective:     Chief Complaint   Patient presents with    Leg Swelling     \" A long time have checked for DVT hospitalized in May for . Has oesteomylitis right leg under care Dr Jackman\" x 6 months.  Redness and draining clear liquid started week ago Dr Jackman  wants a culture       No acute events overnight.  Patient is currently on therapy with cefepime and doxycycline.  No complaints or concerns this a.m.        Patient Active Problem List   Diagnosis Code    HTN (hypertension) I10    Smoking history Z87.891    Carpal tunnel syndrome

## 2024-12-03 NOTE — PLAN OF CARE
Problem: Discharge Planning  Goal: Discharge to home or other facility with appropriate resources  Outcome: Progressing  Flowsheets (Taken 12/1/2024 2056 by Mag Parisi, RN)  Discharge to home or other facility with appropriate resources:   Identify barriers to discharge with patient and caregiver   Arrange for needed discharge resources and transportation as appropriate   Identify discharge learning needs (meds, wound care, etc)     Problem: Confusion  Goal: Confusion, delirium, dementia, or psychosis is improved or at baseline  Description: INTERVENTIONS:  1. Assess for possible contributors to thought disturbance, including medications, impaired vision or hearing, underlying metabolic abnormalities, dehydration, psychiatric diagnoses, and notify attending LIP  2. [place] high risk fall precautions, as indicated  3. Provide frequent short contacts to provide reality reorientation, refocusing and direction  4. Decrease environmental stimuli, including noise as appropriate  5. Monitor and intervene to maintain adequate nutrition, hydration, elimination, sleep and activity  6. If unable to ensure safety without constant attention obtain sitter and review sitter guidelines with assigned personnel  7. Initiate Psychosocial CNS and Spiritual Care consult, as indicated  Outcome: Progressing  Flowsheets (Taken 12/1/2024 2056 by Mag Parisi, RN)  Effect of thought disturbance (confusion, delirium, dementia, or psychosis) are managed with adequate functional status:   [place] high risk fall precautions, as indicated   Provide frequent short contacts to provide reality reorientation, refocusing and direction   Decrease environmental stimuli, including noise as appropriate   Monitor and intervene to maintain adequate nutrition, hydration, elimination, sleep and activity     Problem: Safety - Adult  Goal: Free from fall injury  Outcome: Progressing  Flowsheets (Taken 11/30/2024 2238 by Chelsi Luis, RN)  Free From

## 2024-12-03 NOTE — PROGRESS NOTES
[facility]   Nephrology progress note  12/3/2024, 9:46 AM      Pt Name:    Esau Fan  MRN:     [id number]     YOB: 1950  Admit Date:    11/26/2024  2:00 PM    Chief Complaint: Nephrology following for hyponatremia    Subjective:  Patient was seen and examined this morning  Family member at bedside.   He is eating breakfast.  No complaint.    Objective:  24HR INTAKE/OUTPUT:    Intake/Output Summary (Last 24 hours) at 12/3/2024 0946  Last data filed at 12/2/2024 1317  Gross per 24 hour   Intake 360 ml   Output --   Net 360 ml         I/O last 3 completed shifts:  In: 1271.4 [P.O.:910; IV Piggyback:361.4]  Out: 1075 [Urine:1075]  No intake/output data recorded.   Admission weight: 71.2 kg (157 lb)  Wt Readings from Last 3 Encounters:   12/03/24 70.9 kg (156 lb 4.9 oz)   08/21/24 93.4 kg (206 lb)   05/10/24 93.4 kg (206 lb)        Vitals :   Vitals:    12/02/24 1601 12/02/24 1932 12/03/24 0600 12/03/24 0927   BP: 126/63 130/73  (!) 146/69   Pulse: 61 73  66   Resp: 18 20  18   Temp: 97.9 °F (36.6 °C) 97.7 °F (36.5 °C)  97.8 °F (36.6 °C)   TempSrc: Axillary Axillary  Oral   SpO2: 100% 96%  97%   Weight:   70.9 kg (156 lb 4.9 oz)    Height:           Physical examination  General Appearance: Frail, cachectic appearance.  Muscle wasting  Mouth/Throat: Oral mucosa dry  Neck: No JVD  Lungs: Air entry B/L, no rales, no use of accessory muscles  Heart:  S1, S2 heard  GI: soft, non-tender, no guarding  Ext: mild left leg edema    Medications:  Infusion:    sodium chloride       Meds:    cefepime  2,000 mg IntraVENous Q12H    sodium chloride flush  5-40 mL IntraVENous 2 times per day    enoxaparin  40 mg SubCUTAneous Q24H    doxycycline hyclate  100 mg Oral 2 times per day    memantine  5 mg Oral BID    mirtazapine  7.5 mg Oral Nightly    pantoprazole  40 mg Oral QAM AC    rivastigmine  1 patch TransDERmal Daily    hydrALAZINE  12.5 mg Oral Daily     Meds prn: sodium chloride flush, sodium

## 2024-12-03 NOTE — CARE COORDINATION
12/3/24, 12:51 PM EST    Patient goals/plan/ treatment preferences discussed by  and .  Patient goals/plan/ treatment preferences reviewed with patient/ family.  Patient/ family verbalize understanding of discharge plan and are in agreement with goal/plan/treatment preferences.  Understanding was demonstrated using the teach back method.  AVS provided by RN at time of discharge, which includes all necessary medical information pertaining to the patients current course of illness, treatment, post-discharge goals of care, and treatment preferences. Discharging home with wife. BSC was delivered yesterday. PO atb.     Services At/After Discharge: DME

## 2024-12-03 NOTE — PROGRESS NOTES
[facility] ORTHOPEDICS 7K  Occupational Therapy  Daily Note    Discharge Recommendations: Continue to assess pending progress and 24 hour assistance or supervision  Equipment Recommendations: No        Time In: 0850  Time Out: 922  Timed Code Treatment Minutes: 32 Minutes  Minutes: 32          Date: 12/3/2024  Patient Name: Esau Fan,   Gender: male      Room: Counts include 234 beds at the [facility]  MRN: [id number]  : 1950  (74 y.o.)  Referring Practitioner: Iftikhar Baron PA-C  Diagnosis: Cellulitis  Additional Pertinent Hx: Pt with a history of chronic right osteomyelitis of the right lower extremity, dementia, hypertension, and GERD who presented to [facility] with chief complaint of redness and swelling of the left lower extremity.  History is provided by the patient's wife as the patient has dementia and is alert and oriented to person only at baseline.  The patient is on antibiotics chronically for a history of chronic osteomyelitis of the right lower extremity.  He alternates Bactrim and cefadroxil.  More recently, he has been alternating agents every month.  For the month of November, he has been on cefadroxil.  He follows with Dr. Jackman at [facility].  Over the last week or so, the patient has had redness and swelling of the left lower extremity.  No fevers or chills.  The patient's wife does report that she did witness their dog licking the left lower extremity after the redness had already been present.  The left lower extremity has been draining some serous appearing fluid.  Additionally, the patient was found to be hyponatremic.  He is not currently on any diuretics.  The patient's wife does report he drinks a significant amount of fluids on a daily basis and eats very little.  No current smoking or alcohol use.  She does report he quit smoking approximately 1 month ago.    Restrictions/Precautions:  Restrictions/Precautions: Fall Risk     Social/Functional History:  Lives With: Spouse  Type of Home:

## 2024-12-03 NOTE — PROGRESS NOTES
[facility]  PHYSICAL THERAPY MISSED TREATMENT NOTE  [facility]    Date: 12/3/2024  Patient Name: Esau Fan        MRN: [id number]   : 1950  (74 y.o.)  Gender: male   Referring Practitioner: SEBASTIAN Brown            REASON FOR MISSED TREATMENT:  Missed treat.      Pt with OT. Discussed with spouse and she stated no questions or concerns for home mobility with plans for discharge after lunch. Spouse stated will take gait belt to use at discharge.

## 2024-12-11 NOTE — PROGRESS NOTES
Physician Progress Note      PATIENT:               ANN GALINDO  [facility] #:                  [id number]  :                       1950  ADMIT DATE:       2024 2:00 PM  DISCH DATE:        12/3/2024 2:10 PM  RESPONDING  PROVIDER #:        Gumaro Smiley MD          QUERY TEXT:    Patient admitted with Cellulitis . Noted to have dietician assessment with   severe malnutrition diagnosis in RD note dated . If possible, please   document in progress notes and discharge summary if you are evaluating and /or   treating any of the following:    The medical record reflects the following:  Risk Factors: cellulitis,osteomyelitis,  Clinical Indicators: RD -Malnutrition Status:  Severe malnutrition   (24 1118)  Context:  Chronic Illness  Findings of the 6 clinical characteristics of malnutrition:  Energy Intake:  75% or less estimated energy requirements for 1 month or   longer  Weight Loss:   (difficult to evaluate as UBW fluctuates)  Body Fat Loss:  Severe body fat loss Buccal region  Muscle Mass Loss:   (moderate) Temples (temporalis), Clavicles (pectoralis &   deltoids), Thigh (quadriceps)  Fluid Accumulation:  Unable to assess   Strength:  Not Performed  anthropometric measures-Height: 182.9 cm (6')Ideal Body Weight (IBW): 178 lbs   (81 kg) Admission Body Weight: 71.2 kg (157 lb) ( non-pitting edema)  Current Body Weight: 71.2 kg (157 lb) ( non-pitting edema),   Current BMI   (kg/m2): 21.3  Treatment: Continue Current Diet, Continue Oral Nutrition Supplement,BMI   measurement,serial lab monitoring for albumin    Thank you  ALEX Olea,CDS  Options provided:  -- Protein calorie malnutrition severe  -- Other - I will add my own diagnosis  -- Disagree - Not applicable / Not valid  -- Disagree - Clinically unable to determine / Unknown  -- Refer to Clinical Documentation Reviewer    PROVIDER RESPONSE TEXT:    This patient has severe protein calorie malnutrition.    Query created

## 2025-07-22 ENCOUNTER — HOSPITAL ENCOUNTER (INPATIENT)
Age: 75
LOS: 2 days | Discharge: HOME | DRG: 603 | End: 2025-07-24
Attending: FAMILY MEDICINE
Payer: MEDICARE

## 2025-07-22 ENCOUNTER — APPOINTMENT (OUTPATIENT)
Dept: INTERVENTIONAL RADIOLOGY/VASCULAR | Age: 75
DRG: 603 | End: 2025-07-22
Payer: MEDICARE

## 2025-07-22 ASSESSMENT — PAIN - FUNCTIONAL ASSESSMENT
PAIN_FUNCTIONAL_ASSESSMENT: WONG-BAKER FACES

## 2025-07-22 ASSESSMENT — PAIN SCALES - WONG BAKER
WONGBAKER_NUMERICALRESPONSE: NO HURT
WONGBAKER_NUMERICALRESPONSE: HURTS A LITTLE BIT
WONGBAKER_NUMERICALRESPONSE: NO HURT
WONGBAKER_NUMERICALRESPONSE: HURTS LITTLE MORE

## 2025-07-22 NOTE — ED NOTES
RN to room. Pt is resting in bed with eyes open. Respirations are even and unlabored. Given blanket and pillow. Pt's wife at bedside, updated on the POC. Pt to vascular at this time.

## 2025-07-22 NOTE — ED NOTES
ED nurse-to-nurse bedside report    Chief Complaint   Patient presents with    Leg Pain      LOC: alert and orientated to name, place, date  Vital signs   Vitals:    07/22/25 1655 07/22/25 1804 07/22/25 1814 07/22/25 1845   BP: (!) 122/98 (!) 120/47  (!) 90/59   Pulse: 62  53 56   Resp: 18 14  18   Temp:       TempSrc:       SpO2: 98% 98%  100%   Weight:       Height:          Pain:    Pain Interventions: See MAR   Pain Goal: 0/10   Oxygen: Yes    Current needs required None    Telemetry: Yes  LDAs:   Peripheral IV 07/22/25 Left;Anterior Forearm (Active)   Site Assessment Clean, dry & intact 07/22/25 1532   Line Status Blood return noted;Normal saline locked 07/22/25 1532   Phlebitis Assessment No symptoms 07/22/25 1532   Infiltration Assessment 0 07/22/25 1532   Dressing Status Clean, dry & intact 07/22/25 1532   Dressing Type Transparent 07/22/25 1532     Continuous Infusions:    sodium chloride       Mobility: Requires assistance * 1  [place] Fall Risk Score:        No data to display              Fall Interventions: bed rails, call light, socks  Report given to: AMBER Rojas

## 2025-07-22 NOTE — ED NOTES
ED to inpatient nurses report      Chief Complaint:  Chief Complaint   Patient presents with    Leg Pain     Present to ED from: home    MOA:     LOC: alert and orientated to name, place, date  Mobility: Requires assistance * 2  Oxygen Baseline: none    Current needs required: none     Code Status:   Full Code    What abnormal results were found and what did you give/do to treat them? See labs, imaging.   Any procedures or intervention occur? See MAR.     Mental Status:  Level of Consciousness: Alert (0)    Psych Assessment:        Vitals:  Patient Vitals for the past 24 hrs:   BP Temp Temp src Pulse Resp SpO2 Height Weight   07/22/25 1845 (!) 90/59 -- -- 56 18 100 % -- --   07/22/25 1655 (!) 122/98 -- -- 62 18 98 % -- --   07/22/25 1615 (!) 115/53 -- -- 52 16 100 % -- --   07/22/25 1517 135/71 98.3 °F (36.8 °C) Axillary 60 16 97 % 1.829 m (6') 63.5 kg (140 lb)        LDAs:   Peripheral IV 07/22/25 Left;Anterior Forearm (Active)   Site Assessment Clean, dry & intact 07/22/25 1532   Line Status Blood return noted;Normal saline locked 07/22/25 1532   Phlebitis Assessment No symptoms 07/22/25 1532   Infiltration Assessment 0 07/22/25 1532   Dressing Status Clean, dry & intact 07/22/25 1532   Dressing Type Transparent 07/22/25 1532       Ambulatory Status:  Presents to emergency department  because of falls (Syncope, seizure, or loss of consciousness): No, Age > 70: Yes, Altered Mental Status, Intoxication with alcohol or substance confusion (Disorientation, impaired judgment, poor safety awaremess, or inability to follow instructions): Yes, Impaired Mobility: Ambulates or transfers with assistive devices or assistance; Unable to ambulate or transer.: Yes, Nursing Judgement: Yes    Diagnosis:  DISPOSITION Admitted 07/22/2025 06:47:58 PM   Final diagnoses:   Right leg pain   Cellulitis of left lower extremity   Cellulitis of right lower extremity        Consults:  None     Pain Score:  Pain Assessment  Pain Assessment:

## 2025-07-22 NOTE — ED TRIAGE NOTES
Pt presents to the ED for the evaluation of bilateral leg pain. Pt has hx of dementia per wife. Wife states the pt has osteomyelitis and has been rotating antibiotics since 2014. She says he goes back and forth between bactri and duracef. Pt has redness and swelling to bilateral lower extremities. DP pulses are 2+ bilaterally. Pt reports to pain when the area is touched. Pt is alert and oriented to himself and to place, but not to time. Pt's wife says this is his baseline. EKG completed. IV placed. Pt placed on telemetry.

## 2025-07-22 NOTE — ED NOTES
Pt return from vascular. Pt is resting in bed with eyes open. Respirations are even and unlabored. Wife is at bedside. Telemetry in place. Call light within reach.

## 2025-07-22 NOTE — ED PROVIDER NOTES
[facility] EMERGENCY DEPARTMENT  EMERGENCY DEPARTMENT ENCOUNTER          Pt Name: Esau Fan  MRN: [id number]  Birthdate 1950  Date of evaluation: 7/22/2025  Physician: Michael Mcleod MD  Supervising Attending Physician: Deng Loaiza MD       CHIEF COMPLAINT       Chief Complaint   Patient presents with    Leg Pain         HISTORY OF PRESENT ILLNESS    HPI  Esau Fan is a 75 y.o. male who presents to the emergency department from home, by private vehicle for evaluation of left lower extremity swelling and bilateral redness to the calves and shins.  The history related to this is provided mainly by the wife, who is accompanying her  to the ED here today.  She reports that approximate 2 days ago he was scratching his bilateral lower extremities, and they also have a dog that was scratching in his leg, and since then he has developed redness in the bilateral lower extremities (see media).  The patient and his wife deny history of DVT PE.  There has been no recent long does travel plans or cars.  The patient is very dependent on his wife for getting around.  He is currently being treated for osteomyelitis of the right lower extremity with oral anti-inflammatory medications.  The patient has been on this therapy reporting approximately 8 months.  The wife denies any further injury beyond the scratches noted above.  She does not check his temperature, she denies any confusion.  The patient has no other acute complaints at this time.      PAST MEDICAL AND SURGICAL HISTORY     Past Medical History:   Diagnosis Date    Carpal tunnel syndrome on left     Dementia (HCC)     Dyspepsia     and heartburn    GERD (gastroesophageal reflux disease)     Hiatal hernia     HTN (hypertension) 10/01/2003    isolated systolic    Neuroma     right foot    Osteomyelitis (HCC)     right leg    S/P arthroscopy     right thumb    Smoking history 1969 to 2006    2 ppd    Solitary pulmonary nodule  plan:   Labs  DVT study  Clindamycin    Comorbid conditions pertinent to this ED encounter:  Immobility, osteomyelitis      Differential Diagnosis includes but is not limited to:  Cellulitis  Erysipelas  DVT  Abscess      Decision Rules/Clinical Scores utilized:  Not Applicable       Code Status:  Not addressed during this ED visit    Social determinants of health impacting treatment or disposition:  Considered and not applicable       Medical Decision Making        This patient has 2 to 3 days worth of bilateral lower extremity swelling and erythema.  He scratches himself due to history of Alzheimer's dementia, so he has skin abrasions that resulted in soft tissue infection.  His left lower extremity is more swollen than the right, so he had duplex ultrasound that did not reveal a DVT, but on laboratory studies he does have an elevated lactic, requiring fluid here in the ED.  He is hemodynamically stable, has multiple comorbidities, so initiated IV antibiotics, and would like him admitted for further management of this.      ED COURSE   ED Medications administered this visit (None if left blank):   Medications   clindamycin (CLEOCIN) 600 mg in dextrose 5 % 50 mL IVPB (600 mg IntraVENous New Bag 7/22/25 2581)   sodium chloride 0.9 % bolus 1,000 mL (1,000 mLs IntraVENous New Bag 7/22/25 1718)                PROCEDURES: (None if blank)  Procedures:     MEDICATION CHANGES     New Prescriptions    No medications on file         FINAL DISPOSITION   Shared Decision-Making was performed, disposition discussed with the patient/family and questions answered.      Outpatient follow up (If applicable):  No follow-up provider specified.  Not applicable              FINAL DIAGNOSES:  Final diagnoses:   Right leg pain   Cellulitis of left lower extremity   Cellulitis of right lower extremity       Condition: condition: stable  Dispo: Admit to med/surg floor  DISPOSITION Decision To Admit 07/22/2025 06:29:25 PM   DISPOSITION

## 2025-07-23 PROBLEM — M79.604 RIGHT LEG PAIN: Status: ACTIVE | Noted: 2025-07-23

## 2025-07-23 ASSESSMENT — PAIN SCALES - WONG BAKER: WONGBAKER_NUMERICALRESPONSE: NO HURT

## 2025-07-23 ASSESSMENT — PAIN SCALES - GENERAL: PAINLEVEL_OUTOF10: 5

## 2025-07-23 ASSESSMENT — PAIN DESCRIPTION - ORIENTATION: ORIENTATION: RIGHT;LEFT

## 2025-07-23 ASSESSMENT — PAIN DESCRIPTION - LOCATION: LOCATION: LEG

## 2025-07-23 NOTE — CONSULTS
Hx and P/E :Infectious D.       Patient - Esau Fan,  Age - 75 y.o.    - 1950      Room Number - 6K-16/016-A   N -  [id number]   [facility] # - [id number]  Date of Admission -  2025  3:01 PM  Patient's PCP: Jhonny Sharif MD     Requesting Physician: Carly Casanova MD    CHIEF COMPLAINT:     Cellulitis    ASSESSMENT AND PLAN     Patient is a 75-year-old male who I am consulted for cellulitis.    On presentation, there was concerns for bilateral cellulitis.  This is generally not typical as cellulitis is unilateral in most cases.  There are numerous excoriations on this patient's skin which I believe are traumatic as during exam patient began scratching his leg.  I suspect that many of this patient's risk factors for possible skin inoculation arise from self induced injuries.  This patient has a known history of dementia.  He is currently on chronic Bactrim and cefadroxil.  It is unclear to me the reasoning for monthly courses of alternating Bactrim and first generation cephalosporin therapy, the benefit of prolonged antimicrobials in chronic osteomyelitis is questionable.  I will resume this patient's outpatient courses as is being followed by another physician.  I have discontinued vancomycin as clinical exam is fairly low suspicion for new findings of cellulitis.  Instead, this appears to be a local reaction due to trauma.  I would recommend starting therapy with cephalexin 500 mg 3 times daily.      HISTORY OF PRESENT ILLNESS       This is a very pleasant 75 y.o. male who was admitted to the hospital with a chief complaints of cellulitis.  Infectious disease consulted for antibiotic recommendations and further evaluation.    This patient is a poor historian with a known history of dementia.  On my exam, patient is resting comfortably in bed.  I examined his lower extremities which show evidence of skin breakdown and patient is actively attempting to scratch  numerical 0-10

## 2025-07-23 NOTE — H&P
Hospitalist History & Physical    Patient:  Esau Fan    Unit/Bed:6K-16/016-A  YOB: 1950  MRN: [id number]   PCP: Jhonny Sharif MD  Code Status: Full Code    Date of Service: The patient was seen and examined on 07/22/25 and admitted to Inpatient with an expected length of stay of greater than two midnights due to medical therapy.     Chief Complaint: Redness of the bilateral lower extremities    Assessment/Plan:    Cellulitis of the bilateral lower extremities  Cellulitic changes noted in the bilateral lower extremities, left greater than right.  The patient is on chronic Bactrim and cefadroxil due to a history of chronic osteomyelitis which she alternates on a monthly basis.  Will hold Bactrim for now.  Case discussed with infectious disease.  Will start patient on IV vancomycin for now.  Blood cultures pending.  Lactic acidosis  Continue IV fluids.  Trend lactate.  Unspecified dementia  Continue home medication regimen  Hypertension  Hold home hydralazine due to hypotension in emergency department.  Goals of care  CODE STATUS discussed with patient's spouse.  The patient's spouse states in the event of cardiac or respiratory arrest, she would not want the patient to undergo resuscitative measures including CPR in the event of a cardiac arrest and endotracheal intubation in the setting of isolated respiratory failure.  As such, CODE STATUS changed to limited no x 4.    History of Present Illness:  Esau Fan is a 75 y.o. male with a history of dementia and hypertension who presented to [facility] with chief complaint of erythema of the bilateral lower extremities.  The patient has a history of severe dementia and is alert and oriented to person baseline.  As such, history is provided by the patient's wife.  The patient has a history of chronic osteomyelitis and takes chronic cefadroxil and Bactrim.  Over the last couple of days, the patient's wife has noted

## 2025-07-23 NOTE — PROGRESS NOTES
[facility]   Pharmacy Pharmacokinetic Monitoring Service - Vancomycin     Esau Fan is a 75 y.o. male starting on vancomycin therapy for chronic cellulitis. Pharmacy consulted by SEBASTIAN Tidwell for monitoring and adjustment.    Target Concentration: Goal AUC/ALYSON 400-600 mg*hr/L    Additional Antimicrobials: clindamycin x1 in ED, Bactrim/cefadroxil (chronic)    Pertinent Laboratory Values:   Wt Readings from Last 1 Encounters:   07/22/25 63.5 kg (140 lb)     Estimated Creatinine Clearance: 43 mL/min (A) (based on SCr of 1.3 mg/dL (H)).  Recent Labs     07/22/25  1530   CREATININE 1.3*     Pertinent Cultures:  Date Source Results   7/22/25 Blood pending   MRSA Nasal Swab: N/A. Non-respiratory infection.    Plan:  Dosing recommendations based on Bayesian software  Start vancomycin 1500 mg loading dose, followed by 1000 mg IV q24h for anticipated AUC of 520 and trough concentration of 14.3 at steady state  Renal labs as indicated   Vancomycin concentration planned in next 48-72 hours  Pharmacy will monitor renal function and adjust therapy as indicated    *Patient with noted allergy to vancomycin - okay to proceed for now per hospitalist and ID*    Thank you for the consult,  Jc Haider, PharmD  7/22/2025 9:01 PM

## 2025-07-23 NOTE — PROGRESS NOTES
PROGRESS NOTE      Patient:  Esau CAIN Doute  Unit/Bed:6K-16/016-A  YOB: 1950  MRN: [id number]   Acct: [id number]    PCP: Jhonny Sharif MD    Date of Admission: 7/22/2025 LOS: 1    Date of Evaluation:  7/23/2025    Anticipated Discharge: pending clinical course    Assessment/Plan:     Bilateral lower extremity cellulitis. In the setting of chronic right lower extremity osteomyelitis. Follows with wound care and outpatient infectious disease, on chronic Bactrim and cefadroxil which alternates monthly Bactrim held on admission.  Doppler of left lower extremity with no DVT noted on 7/22.  WBC within normal limits.  -Infectious disease consulted on admission, appreciate recommendations.    -IV vancomycin given on 7/22.    -ID started cephalexin on 7/23, with duration of 30 days.  -Pending blood cultures ordered on admission.  - Continue to monitor for improvement.  -Continue nursing wound care.    Lactic acidosis.  Likely 2/2 bilateral lower extremity cellulitis, per above.  WBC within normal limits.  Lactate on admission of 3, IV fluids were started, and down trended to 1.1 on 7/23.  Continue to monitor.  Continue IV fluids.    NAGMA.  In the setting of lactic acidosis.  Bicarb of 20 on admission, remains unchanged on 7/23.  Anion gap stable.  Continue IV fluids.  Continue to monitor BMP.    Dementia.  Known dementia, per chart review patient will occasionally scratch at his legs.  Continue home meds memantine and risperidone.     Essential Hypertension.  Hydralazine held on admission due to hypotension in the emergency room.  Continue to monitor BP.    Chronic normocytic anemia.  Likely 2/2 malnutrition due to dementia.  Baseline hemoglobin of 10-12.  Hemoglobin on admission 13.8, down to 10.9 likely dilutional.  No signs and symptoms of bleeding.  Continue to monitor CBC.    Malnutrition.  Continue regular diet.  ONS ordered.    Insomnia. Continue home med trazodone.    Physical deconditioning.

## 2025-07-23 NOTE — CARE COORDINATION
Case Management Assessment Initial Evaluation    Date/Time of Evaluation: 2025 8:40 AM  Assessment Completed by: Shawna Perez RN    If patient is discharged prior to next notation, then this note serves as note for discharge by case management.    Patient Name: Esau Fan                   YOB: 1950  Diagnosis: Cellulitis [L03.90]  Right leg pain [M79.604]  Cellulitis of right lower extremity [L03.115]  Cellulitis of left lower extremity [L03.116]                   Date / Time: 2025  3:01 PM  Location: [facility]     Patient Admission Status: Inpatient   Readmission Risk Low 0-14, Mod 15-19), High > 20: Readmission Risk Score: 15.2    Current PCP: Jhonny Sharif MD  Health Care Decision Makers:   Primary Decision Maker: Marialuisa Fan - Spouse - [phone]    Additional Case Management Notes: Admitted through ED with BLE redness. Blood cultures pending x2. Afebrile. IVF. Vancomycin iv daily. Cleocin iv x1. Consulted ID. Limited code x4. PT/OT.     Procedures: None    Imagin/22 Venous doppler BLE: Normal venous ultrasound. No evidence for acute deep venous thrombosis.    Patient Goals/Plan/Treatment Preferences: Spoke with pt's wife Marialuisa as pt not able to answer questions. Marialuisa has been caring for pt at home. She states they are getting ready to move to their daughter's home while renovations are being done at their house. She is interested in HH or SNF for pt. Will consult .        25 1013   Service Assessment   Patient Orientation Alert and Oriented;Person;Self   Cognition Alert  (has some confusion, talks off the wall)   History Provided By Spouse   Primary Caregiver Spouse   Accompanied By/Relationship wife   Support Systems Spouse/Significant Other;Children;Family Members   Patient's Healthcare Decision Maker is: Legal Next of Kin   PCP Verified by CM Yes   Last Visit to PCP Within last 6 months   Prior Functional Level Assistance with the

## 2025-07-23 NOTE — CARE COORDINATION
DISCHARGE PLANNING EVALUATION  7/23/25, 11:49 AM EDT    Reason for Referral:  HH versus ECF  Decision Maker:  Self, wife has support  Current Services:  None  New Services Requested:  Patient's wife interested in a rehab stay.  Family/ Social/ Home environment:  Patient resides at home with wife and she assists in his care.  They would be going to stay at her daughters for a time after patient's discharge from the hospice.   Payment Source: Medicare  Transportation at Discharge:  To be determined likely ambulance  Post-acute (PAC) provider list was provided to patient. Patient was informed of their freedom to choose PAC provider. Discussed and offered to show the patient the relevant PAC Providers quality and resource use measures on Medicare Compare web site via computer based on patient's goals of care and treatment preferences. Questions regarding selection process were answered.      Teach Back Method used with Long regarding care plan and options for discharge.   Patient and Spouse verbalized understanding of the plan of care and contribute to goal setting.       Patient preferences and discharge plan: Wife would like patient to have a rehab stay.  Wife is reviewing medicare.gov website and will talk to family.  Will need follow up for choices.     Electronically signed by YOLIS Whitmore on 7/23/2025 at 11:49 AM

## 2025-07-23 NOTE — PROGRESS NOTES
Spiritual Health History and Assessment/Progress Note  [facility]    Initial Encounter, Spiritual/Emotional Needs,  ,  ,      Name: Esau Fan MRN: [id number]    Age: 75 y.o.     Sex: male   Language: English   Zoroastrian: None   Cellulitis     Date: 7/23/2025            Total Time Calculated: (P) 10 min              Spiritual Assessment began in [facility] 6K        Referral/Consult From: (P) Rounding   Encounter Overview/Reason: Initial Encounter, Spiritual/Emotional Needs  Service Provided For: Patient and family together    Lucy, Belief, Meaning:   Patient is connected with a lucy tradition or spiritual practice  Family/Friends identify as spiritual      Importance and Influence:  Patient has spiritual/personal beliefs that influence decisions regarding their health  Family/Friends have spiritual/personal beliefs that influence decisions regarding the patient's health    Community:  Patient feels well-supported. Support system includes: Extended family  Family/Friends feel well-supported. Support system includes: Extended family    Assessment and Plan of Care:   In my encounter with the 75 yr old patient the pt's family was supportively present. While rounding the unit 6K,  I provided spiritual care to patient and their family through conversation, I also came to assess their spiritual needs present. The pt was admitted due to cellulitis.     Patient Interventions include: Facilitated expression of thoughts and feelings  Family/Friends Interventions include: Facilitated expression of thoughts and feelings    Patient Plan of Care: Spiritual Care available upon further referral  Family/Friends Plan of Care: Spiritual Care available upon further referral    Electronically signed by PELON Young on 7/23/2025 at 4:19 PM

## 2025-07-24 PROBLEM — T14.8XXA SKIN EXCORIATION: Status: ACTIVE | Noted: 2025-07-24

## 2025-07-24 PROBLEM — F03.90 DEMENTIA (HCC): Status: ACTIVE | Noted: 2024-05-11

## 2025-07-24 NOTE — CARE COORDINATION
7/24/25, 10:52 AM EDT    DISCHARGE PLANNING EVALUATION    Follow up with spouse and pt regarding discharge POC.  Planning discharge home today.  Would like [facility]. Possible hospital bed if pt qualifies.    Spouse Marialuisa states that they are moving to their daughters home ([address] [place] [address]) this weekend.    Referral placed in [facility] for [facility].  NOLA spoke with Ronda with . NOLA called Maria T at [facility] to see if pt will qualify for bed.  KATE Matos updated.  AMBER Rojas is aware.    11:11 AM update:  received voicemail from Maria T with [facility], pt DOES qualify for hospital bed.  Maria T to call spouse to make arrangements for delivery.     7/24/25, 10:54 AM EDT    Patient goals/plan/ treatment preferences discussed by  and .  Patient goals/plan/ treatment preferences reviewed with patient/ family.  Patient/ family verbalize understanding of discharge plan and are in agreement with goal/plan/treatment preferences.  Understanding was demonstrated using the teach back method.  AVS provided by RN at time of discharge, which includes all necessary medical information pertaining to the patients current course of illness, treatment, post-discharge goals of care, and treatment preferences.     Services At/After Discharge: DME, Home Health, Nursing service, OT, and PT

## 2025-07-24 NOTE — DISCHARGE SUMMARY
DISCHARGE SUMMARY      Patient Identification:   Esau Fan   : 1950  MRN: [id number]   Account: [id number]      Patient's PCP: Jhonny Sharif MD    Admit Date: 2025     Discharge Date: 2025      Admitting Physician: Silvia Zarco MD     Discharge Physician: Mili Lozano MD     Discharge Diagnoses:    Bilateral lower extremity cellulitis. In the setting of chronic right lower extremity osteomyelitis. Follows with wound care and outpatient infectious disease, on chronic Bactrim and cefadroxil which alternates monthly Bactrim held on admission.  Doppler of left lower extremity with no DVT noted on .  WBC within normal limits. IV vancomycin given on .  ID started cephalexin on , with duration of 30 days.     Lactic acidosis.  Likely 2/2 bilateral lower extremity cellulitis, per above.  WBC within normal limits.  Lactate on admission of 3, IV fluids were started, and down trended to 1.1 on .       NAGMA. In the setting of lactic acidosis. Bicarb of 20 on admission, remains unchanged on .  Anion gap stable. Received IV fluids and improved.      Dementia. Known dementia, per chart review patient will occasionally scratch at his legs.  Continue home meds memantine and risperidone.      Essential Hypertension.  Hydralazine held on admission due to hypotension in the emergency room, may resume.      Chronic normocytic anemia.  Likely 2/2 malnutrition due to dementia.  Baseline hemoglobin of 10-12.  Hemoglobin on admission 13.8, down to 10.9 likely dilutional.  No signs and symptoms of bleeding.     Malnutrition.       Insomnia. Continue home med trazodone.     Physical deconditioning. Home with home health.     The patient was seen and examined on day of discharge and this discharge summary is in conjunction with any daily progress note from day of discharge.    Hospital Course:   74 yo male with pmhx of dementia, anemia, and hypertension who presented to [facility]  07/24/2025 08:05 AM    HCT 36.9 07/24/2025 08:05 AM     07/24/2025 08:05 AM       Renal:    Lab Results   Component Value Date/Time     07/24/2025 08:05 AM    K 3.9 07/24/2025 08:05 AM    K 4.5 07/23/2025 05:56 AM     07/24/2025 08:05 AM    CO2 19 07/24/2025 08:05 AM    BUN 13 07/24/2025 08:05 AM    CREATININE 0.9 07/24/2025 08:05 AM    CALCIUM 8.5 07/24/2025 08:05 AM         Significant Diagnostic Studies    Radiology:   Vascular duplex lower extremity venous left   Final Result   Normal venous ultrasound. No evidence for acute deep venous   thrombosis.            **This report has been created using voice recognition software.  It may contain   minor errors which are inherent in voice recognition technology.**      Electronically signed by Dr. Asael Miranda             Consults:     IP CONSULT TO INFECTIOUS DISEASES  IP CONSULT TO SOCIAL WORK  IP CONSULT TO HOME CARE NEEDS    Disposition:    [x] Home       [] TCU       [] Rehab       [] Psych       [] SNF       [] Long Term Care Facility       [] Other-    Condition at Discharge: Stable    Code Status:  Prior     Patient Instructions:    Activity: activity as tolerated  Diet: No diet orders on file      Follow-up visits:   Jhonny Sharif MD  [facility] [address] [address] [phone]    Go on 7/30/2025  Hospital follow up appointment, Appt time: 11:30am    [facility]  [address] [place] [id number] [phone]             Discharge Medications:        Medication List        PAUSE taking these medications      hydrALAZINE 25 MG tablet  Wait to take this until your doctor or other care provider tells you to start again.  Commonly known as: APRESOLINE  Take 0.5 tablets by mouth daily            START taking these medications      cephALEXin 500 MG capsule  Commonly known as: KEFLEX  Take 1 capsule by mouth every 8 hours            CONTINUE taking these medications      memantine 5 MG

## 2025-07-24 NOTE — PROCEDURES
PROCEDURE NOTE  Date: 7/24/2025   Name: Esau Fan  YOB: 1950    Procedures  12 lead EKG completed. Results handed to Crystal CLARK.

## 2025-07-24 NOTE — PROGRESS NOTES
Discharge teaching and instructions for diagnosis of Cellulitis completed with patient using teachback method. AVS reviewed. Printed prescriptions given to patient. Patient voiced understanding regarding prescriptions, follow up appointments, and care of self at home. Discharged in a wheelchair to  home with support per family

## 2025-07-24 NOTE — PROGRESS NOTES
[facility] Infectious Disease         Progress Note      Esau Fan  MEDICAL RECORD NUMBER:  [id number]  AGE: 75 y.o.   GENDER: male  : 1950  EPISODE DATE:  2025      Assessment:     Patient is a 75-year-old male whom consulted for cellulitis.    This patient has had multiple episodes of cellulitis involving his bilateral lower extremities.  This occurs in the setting of reported chronic osteomyelitis.  He is currently following with an outpatient infectious disease provider who is alternating therapy with Bactrim and cefadroxil.  I did resume therapy with cephalexin as a first generation cephalosporin on .  On my exam, this patient has had traumatic wounds to his lower extremity and I therefore discontinued therapy with vancomycin.  Recommend continuing therapy with Keflex and would defer further management to outpatient provider.      Subjective:     Chief Complaint   Patient presents with    Leg Pain         No acute events overnight, no new complaints or concerns this morning.  Patient does have a known history of dementia and is unable to provide significant history.      Patient Active Problem List   Diagnosis Code    HTN (hypertension) I10    Smoking history Z87.891    Carpal tunnel syndrome on left G56.02    Hiatal hernia K44.9    GERD (gastroesophageal reflux disease) K21.9    Solitary pulmonary nodule R91.1    Dyspepsia R10.13    Neuroma D36.10    S/P arthroscopy Z98.890    Tinnitus H93.19    Depression with suicidal ideation F32.A, R45.851    Dementia with behavioral disturbance (HCC) F03.918    Cellulitis L03.90    Ulcer of left medial lower extremity, limited to breakdown of skin (HCC) L97.821    Swelling of left lower extremity M79.89    Moderate Alzheimer's dementia (HCC) G30.9, F02.B0    Physical deconditioning R53.81    Chronic osteomyelitis (HCC) M86.60    Stage 2 chronic kidney disease N18.2    Hyponatremia E87.1    Severe malnutrition E43    Right leg pain M79.604  MG/24HR Place 1 patch onto the skin daily (Patient not taking: Reported on 7/23/2025) 30 patch 3    omeprazole (PRILOSEC) 20 MG delayed release capsule Take 1 capsule by mouth 2 times daily (Patient not taking: Reported on 7/23/2025) 60 capsule 0    rivastigmine (EXELON) 4.6 MG/24HR Place 1 patch onto the skin daily (Patient not taking: Reported on 7/23/2025) 30 patch 0       REVIEW OF SYSTEMS    Limited review of systems due to mentation      Objective:      /60   Pulse 58   Temp 97.5 °F (36.4 °C) (Oral)   Resp 19   Ht 1.829 m (6')   Wt 68.3 kg (150 lb 9.2 oz)   SpO2 90%   BMI 20.42 kg/m²     Wt Readings from Last 3 Encounters:   07/24/25 68.3 kg (150 lb 9.2 oz)   12/03/24 70.9 kg (156 lb 4.9 oz)   08/21/24 93.4 kg (206 lb)         There is no immunization history on file for this patient.    PHYSICAL EXAM    /60   Pulse 58   Temp 97.5 °F (36.4 °C) (Oral)   Resp 19   Ht 1.829 m (6')   Wt 68.3 kg (150 lb 9.2 oz)   SpO2 90%   BMI 20.42 kg/m²   General: Chronically ill-appearing male in no acute distress  HEENT: pink conjunctiva, unicteric sclera, moist oral mucosa.  Chest: Clear to auscultation  Cardiovascular:  RRR ,S1S2, no murmur or gallop.  Abdomen:  Soft, non tender to palpation.  Extremities: Normal-appearing lower extremities  Skin: Multiple excoriations of the lower extremities bilateral  CNS: Altered, dementia    Wound 11/30/24 Coccyx Mid;Left;Right nonblanching redness with purple discoloration (Active)   Number of days: 235       Wound 07/22/25 Pretibial Right (Active)   Wound Etiology Traumatic 07/23/25 2042   Dressing Status New dressing applied 07/23/25 2042   Wound Cleansed Cleansed with saline 07/23/25 2042   Dressing/Treatment Foam 07/23/25 2042   Wound Assessment Bleeding;Pink/red 07/23/25 2042   Drainage Amount Small (< 25%) 07/23/25 2042   Drainage Description Thin;Serosanguinous 07/23/25 2042   Odor None 07/23/25 2042   Number of days: 1       Wound 07/22/25 Pretibial

## 2025-07-24 NOTE — PROGRESS NOTES
[facility]  INPATIENT OCCUPATIONAL THERAPY  [facility] RENAL TELEMETRY 6K  EVALUATION      Discharge Recommendations: Long Term Care with OT, 24 hour supervision or assist  Equipment Recommendations: No        Time In: 0951  Time Out: 1022  Timed Code Treatment Minutes: 23 Minutes  Minutes: 31          Date: 2025  Patient Name: Esau Fan,   Gender: male      MRN: [id number]  : 1950  (75 y.o.)  Referring Practitioner: Mili Lozano MD  Diagnosis: Cellulitis  Additional Pertinent Hx: per chart review; \"Esau Fan is a 75 y.o. male with a history of dementia and hypertension who presented to [facility] with chief complaint of erythema of the bilateral lower extremities.  The patient has a history of severe dementia and is alert and oriented to person baseline.  As such, history is provided by the patient's wife.  The patient has a history of chronic osteomyelitis and takes chronic cefadroxil and Bactrim.  Over the last couple of days, the patient's wife has noted worsening erythema in the bilateral lower extremities, left greater than right.  The patient, due to his dementia, does scratch his legs frequently.  They also have dogs in the house which have been noted to lick the patient's legs in the past.  No reported fevers or chills.  No reported period.  The patient is currently alert and oriented to person only.\"    Restrictions/Precautions:  Restrictions/Precautions: General Precautions, Fall Risk    Subjective  Chart Reviewed: Yes, Orders, Progress Notes, History and Physical  Patient assessed for rehabilitation services?: Yes  Family / Caregiver Present: Yes (spouse)    Subjective: RN approved session, patient supine in bed upon OT arrival and agreeable to eval. patient A & O x 1. patient's spouse present.    Pain: patient constantly itching legs and picking. Cues t/o to leave alone.     Vitals: Vitals not assessed per clinical judgement, see nursing

## 2025-07-24 NOTE — PLAN OF CARE
Esau Fan was evaluated today and a DME order was entered for variable height hospital bed because he requires assistance for positioning needs not possible in an ordinary bed, complexity of body positioning needs.  Patient needs variability of bed height to perform patient transfers and for eating, bathing, toileting, personal cares, ambulating, grooming, hygiene, dressing upper body, dressing lower body, meal preparation, and taking own medications.  Current body Weight - Scale: 68.3 kg (150 lb 9.2 oz).  The need for this equipment was discussed with the patient and he understands and is in agreement.    Electronically signed by Mili Lozano MD on 7/24/2025 at 10:42 AM

## 2025-07-24 NOTE — PLAN OF CARE
Problem: Discharge Planning  Goal: Discharge to home or other facility with appropriate resources  7/24/2025 0130 by Magnolia Kwok, RN  Outcome: Progressing  Flowsheets (Taken 7/24/2025 0130)  Discharge to home or other facility with appropriate resources:   Arrange for needed discharge resources and transportation as appropriate   Identify discharge learning needs (meds, wound care, etc)   Refer to discharge planning if patient needs post-hospital services based on physician order or complex needs related to functional status, cognitive ability or social support system   Arrange for interpreters to assist at discharge as needed   Identify barriers to discharge with patient and caregiver  7/23/2025 1148 by Micaela Miles LSW  Outcome: Progressing     Problem: Pain  Goal: Verbalizes/displays adequate comfort level or baseline comfort level  Outcome: Progressing  Flowsheets (Taken 7/24/2025 0130)  Verbalizes/displays adequate comfort level or baseline comfort level:   Encourage patient to monitor pain and request assistance   Assess pain using appropriate pain scale   Administer analgesics based on type and severity of pain and evaluate response   Consider cultural and social influences on pain and pain management   Notify Licensed Independent Practitioner if interventions unsuccessful or patient reports new pain   Implement non-pharmacological measures as appropriate and evaluate response     Problem: Skin/Tissue Integrity  Goal: Skin integrity remains intact  Description: 1.  Monitor for areas of redness and/or skin breakdown  2.  Assess vascular access sites hourly  3.  Every 4-6 hours minimum:  Change oxygen saturation probe site  4.  Every 4-6 hours:  If on nasal continuous positive airway pressure, respiratory therapy assess nares and determine need for appliance change or resting period  Outcome: Progressing  Flowsheets (Taken 7/24/2025 0130)  Skin Integrity Remains Intact:   Monitor for areas of